# Patient Record
Sex: FEMALE | Race: NATIVE HAWAIIAN OR OTHER PACIFIC ISLANDER | Employment: UNEMPLOYED | URBAN - METROPOLITAN AREA
[De-identification: names, ages, dates, MRNs, and addresses within clinical notes are randomized per-mention and may not be internally consistent; named-entity substitution may affect disease eponyms.]

---

## 2022-05-19 ENCOUNTER — HOSPITAL ENCOUNTER (EMERGENCY)
Age: 22
Discharge: HOME | End: 2022-05-19
Attending: STUDENT IN AN ORGANIZED HEALTH CARE EDUCATION/TRAINING PROGRAM | Admitting: STUDENT IN AN ORGANIZED HEALTH CARE EDUCATION/TRAINING PROGRAM
Payer: OTHER GOVERNMENT

## 2022-05-19 VITALS
HEART RATE: 53 BPM | DIASTOLIC BLOOD PRESSURE: 75 MMHG | WEIGHT: 120 LBS | OXYGEN SATURATION: 100 % | BODY MASS INDEX: 25.89 KG/M2 | HEIGHT: 57 IN | RESPIRATION RATE: 16 BRPM | TEMPERATURE: 98.4 F | SYSTOLIC BLOOD PRESSURE: 122 MMHG

## 2022-05-19 DIAGNOSIS — N94.6 DYSMENORRHEA: Primary | ICD-10-CM

## 2022-05-19 DIAGNOSIS — D72.829 LEUKOCYTOSIS, UNSPECIFIED TYPE: ICD-10-CM

## 2022-05-19 LAB
ALBUMIN SERPL-MCNC: 4.5 G/DL (ref 3.5–5)
ALBUMIN/GLOB SERPL: 1.5 {RATIO} (ref 1.1–2.2)
ALP SERPL-CCNC: 61 U/L (ref 45–117)
ALT SERPL-CCNC: 21 U/L (ref 12–78)
ANION GAP SERPL CALC-SCNC: 7 MMOL/L (ref 5–15)
APPEARANCE UR: ABNORMAL
AST SERPL W P-5'-P-CCNC: 24 U/L (ref 15–37)
BACTERIA URNS QL MICRO: NEGATIVE /HPF
BASOPHILS # BLD: 0 K/UL (ref 0–0.1)
BASOPHILS NFR BLD: 0 % (ref 0–1)
BILIRUB SERPL-MCNC: 0.9 MG/DL (ref 0.2–1)
BILIRUB UR QL: NEGATIVE
BUN SERPL-MCNC: 11 MG/DL (ref 6–20)
BUN/CREAT SERPL: 13 (ref 12–20)
CA-I BLD-MCNC: 9.1 MG/DL (ref 8.5–10.1)
CHLORIDE SERPL-SCNC: 108 MMOL/L (ref 97–108)
CO2 SERPL-SCNC: 25 MMOL/L (ref 21–32)
COLOR UR: ABNORMAL
CREAT SERPL-MCNC: 0.84 MG/DL (ref 0.55–1.02)
DIFFERENTIAL METHOD BLD: ABNORMAL
EOSINOPHIL # BLD: 0 K/UL (ref 0–0.4)
EOSINOPHIL NFR BLD: 0 % (ref 0–7)
ERYTHROCYTE [DISTWIDTH] IN BLOOD BY AUTOMATED COUNT: 12.6 % (ref 11.5–14.5)
GLOBULIN SER CALC-MCNC: 3.1 G/DL (ref 2–4)
GLUCOSE SERPL-MCNC: 106 MG/DL (ref 65–100)
GLUCOSE UR STRIP.AUTO-MCNC: NEGATIVE MG/DL
HCG SERPL QL: NEGATIVE
HCT VFR BLD AUTO: 37.8 % (ref 35–47)
HGB BLD-MCNC: 12.6 G/DL (ref 11.5–16)
HGB UR QL STRIP: ABNORMAL
HYALINE CASTS URNS QL MICRO: ABNORMAL /LPF (ref 0–5)
IMM GRANULOCYTES # BLD AUTO: 0.1 K/UL (ref 0–0.04)
IMM GRANULOCYTES NFR BLD AUTO: 0 % (ref 0–0.5)
KETONES UR QL STRIP.AUTO: 80 MG/DL
KOH PREP SPEC: NORMAL
LEUKOCYTE ESTERASE UR QL STRIP.AUTO: ABNORMAL
LYMPHOCYTES # BLD: 2 K/UL (ref 0.8–3.5)
LYMPHOCYTES NFR BLD: 14 % (ref 12–49)
MCH RBC QN AUTO: 29.7 PG (ref 26–34)
MCHC RBC AUTO-ENTMCNC: 33.3 G/DL (ref 30–36.5)
MCV RBC AUTO: 89.2 FL (ref 80–99)
MONOCYTES # BLD: 0.7 K/UL (ref 0–1)
MONOCYTES NFR BLD: 5 % (ref 5–13)
MUCOUS THREADS URNS QL MICRO: ABNORMAL /LPF
NEUTS SEG # BLD: 11.7 K/UL (ref 1.8–8)
NEUTS SEG NFR BLD: 81 % (ref 32–75)
NITRITE UR QL STRIP.AUTO: NEGATIVE
NRBC # BLD: 0 K/UL (ref 0–0.01)
NRBC BLD-RTO: 0 PER 100 WBC
PH UR STRIP: 6 [PH] (ref 5–8)
PLATELET # BLD AUTO: 172 K/UL (ref 150–400)
PMV BLD AUTO: 11.9 FL (ref 8.9–12.9)
POTASSIUM SERPL-SCNC: 3.4 MMOL/L (ref 3.5–5.1)
PROT SERPL-MCNC: 7.6 G/DL (ref 6.4–8.2)
PROT UR STRIP-MCNC: 100 MG/DL
RBC # BLD AUTO: 4.24 M/UL (ref 3.8–5.2)
RBC #/AREA URNS HPF: ABNORMAL /HPF (ref 0–5)
SODIUM SERPL-SCNC: 140 MMOL/L (ref 136–145)
SP GR UR REFRACTOMETRY: 1.03 (ref 1–1.03)
SPECIAL REQUESTS,SREQ: NORMAL
TRICHOMONAS RAPID AG, TRICR: NEGATIVE
UA: UC IF INDICATED,UAUC: ABNORMAL
UROBILINOGEN UR QL STRIP.AUTO: 2 EU/DL (ref 0.1–1)
WBC # BLD AUTO: 14.6 K/UL (ref 3.6–11)
WBC URNS QL MICRO: ABNORMAL /HPF (ref 0–4)

## 2022-05-19 PROCEDURE — 99283 EMERGENCY DEPT VISIT LOW MDM: CPT

## 2022-05-19 PROCEDURE — 85025 COMPLETE CBC W/AUTO DIFF WBC: CPT

## 2022-05-19 PROCEDURE — 84703 CHORIONIC GONADOTROPIN ASSAY: CPT

## 2022-05-19 PROCEDURE — 81001 URINALYSIS AUTO W/SCOPE: CPT

## 2022-05-19 PROCEDURE — 87491 CHLMYD TRACH DNA AMP PROBE: CPT

## 2022-05-19 PROCEDURE — 87808 TRICHOMONAS ASSAY W/OPTIC: CPT

## 2022-05-19 PROCEDURE — 74011250637 HC RX REV CODE- 250/637: Performed by: STUDENT IN AN ORGANIZED HEALTH CARE EDUCATION/TRAINING PROGRAM

## 2022-05-19 PROCEDURE — 36415 COLL VENOUS BLD VENIPUNCTURE: CPT

## 2022-05-19 PROCEDURE — 80053 COMPREHEN METABOLIC PANEL: CPT

## 2022-05-19 PROCEDURE — 87210 SMEAR WET MOUNT SALINE/INK: CPT

## 2022-05-19 PROCEDURE — 87086 URINE CULTURE/COLONY COUNT: CPT

## 2022-05-19 RX ORDER — OXYCODONE AND ACETAMINOPHEN 5; 325 MG/1; MG/1
1 TABLET ORAL
Qty: 7 TABLET | Refills: 0 | Status: SHIPPED | OUTPATIENT
Start: 2022-05-19 | End: 2022-05-21

## 2022-05-19 RX ORDER — OXYCODONE AND ACETAMINOPHEN 5; 325 MG/1; MG/1
1 TABLET ORAL
Status: COMPLETED | OUTPATIENT
Start: 2022-05-19 | End: 2022-05-19

## 2022-05-19 RX ORDER — IBUPROFEN 400 MG/1
400 TABLET ORAL
Qty: 20 TABLET | Refills: 0 | Status: SHIPPED | OUTPATIENT
Start: 2022-05-19 | End: 2022-06-01

## 2022-05-19 RX ORDER — ACETAMINOPHEN 325 MG/1
650 TABLET ORAL
Qty: 40 TABLET | Refills: 0 | Status: SHIPPED | OUTPATIENT
Start: 2022-05-19 | End: 2022-06-01

## 2022-05-19 RX ORDER — ACETAMINOPHEN 325 MG/1
650 TABLET ORAL
Status: COMPLETED | OUTPATIENT
Start: 2022-05-19 | End: 2022-05-19

## 2022-05-19 RX ADMIN — OXYCODONE HYDROCHLORIDE AND ACETAMINOPHEN 1 TABLET: 5; 325 TABLET ORAL at 04:06

## 2022-05-19 RX ADMIN — ACETAMINOPHEN 650 MG: 325 TABLET ORAL at 04:06

## 2022-05-19 NOTE — ED NOTES
Pelvic exam done by dr Jam Razo with assist of maddie joe rn. Specimens were obtained and taken to lab.

## 2022-05-19 NOTE — ED PROVIDER NOTES
[id number]  EMERGENCY DEPARTMENT ENCOUNTER NOTE    Date: 5/19/2022  Patient Name: Claudetta Denis      History of Presenting Illness     Chief Complaint   Patient presents with    Abdominal Pain       History Provided By: Patient    HPI: Claudetta Denis, 22 y.o. female with PMH of dysmenorrhea presenting to the ED with abdominal cramps, vomiting, and vaginal bleeding. Patient reports that she started having her cycle today in which he has been having suprapubic abdominal cramping which described as cramping and twisting, episodic, nothing specific makes it better or worse associated with heavy vaginal bleeding. She vomited approximately 3 times since her pain started at 7 PM.  She reports this is similar to pain that she has when she has her menstrual cycle. She has been trying to get referral to gynecology but was unable to due to issues with her primary care and insurance. Sexually active but uses protection. She is denying any preceding vaginal discharge. There are no other complaints, changes, or physical findings at this time. PCP: Janelle Douglass MD    Current Outpatient Medications   Medication Sig Dispense Refill    oxyCODONE-acetaminophen (Percocet) 5-325 mg per tablet Take 1 Tablet by mouth every eight (8) hours as needed for Pain for up to 2 days. Max Daily Amount: 3 Tablets. 7 Tablet 0    acetaminophen (TYLENOL) 325 mg tablet Take 2 Tablets by mouth every six (6) hours as needed for Pain. 40 Tablet 0    ibuprofen (MOTRIN) 400 mg tablet Take 1 Tablet by mouth every six (6) hours as needed for Pain. 20 Tablet 0    montelukast (SINGULAIR) 5 mg chewable tablet Take 5 mg by mouth nightly. Indications: ASTHMA PREVENTION      mometasone (NASONEX) 50 mcg/actuation nasal spray 2 Sprays as needed.  fluticasone (FLOVENT HFA) 110 mcg/actuation inhaler Take 2 Puffs by inhalation two (2) times a day.     Indications: ASTHMA PREVENTION      cetirizine (ZYRTEC) 5 mg/5 mL Soln Take 5 mg by mouth as needed.  ALBUTEROL IN Take  by inhalation two (2) times a day. MOTHER STATES USES 1-2 PUFFS 2 TIMES AS NEEDED          Past History     Past Medical History:  Past Medical History:   Diagnosis Date    Asthma        Past Surgical History:  History reviewed. No pertinent surgical history. Family History:  Family History   Problem Relation Age of Onset    Asthma Mother     Hypertension Father     Asthma Maternal Grandmother        Social History:  Social History     Tobacco Use    Smoking status: Never Smoker    Smokeless tobacco: Never Used   Substance Use Topics    Alcohol use: No    Drug use: Yes     Types: Marijuana       Allergies:  No Known Allergies      Review of Systems     Review of Systems  A 10 point review of system was performed was negative except as noted above in HPI    Physical Exam     Physical Exam  Vitals and nursing note reviewed. Exam conducted with a chaperone present. Constitutional:       General: She is not in acute distress. Appearance: She is well-developed. She is not diaphoretic. HENT:      Head: Normocephalic and atraumatic. Eyes:      Extraocular Movements: Extraocular movements intact. Conjunctiva/sclera: Conjunctivae normal.   Cardiovascular:      Rate and Rhythm: Normal rate and regular rhythm. Heart sounds: Normal heart sounds. Pulmonary:      Effort: Pulmonary effort is normal.      Breath sounds: Normal breath sounds. Abdominal:      Palpations: Abdomen is soft. Tenderness: There is abdominal tenderness in the suprapubic area. Genitourinary:     Vagina: No signs of injury and foreign body. Bleeding present. Cervix: Cervical bleeding present. No cervical motion tenderness, friability, lesion, erythema or eversion. Adnexa: Right adnexa normal and left adnexa normal.   Musculoskeletal:      Cervical back: Neck supple.       Right lower leg: No tenderness. No edema. Left lower leg: No tenderness. No edema. Neurological:      General: No focal deficit present. Mental Status: She is alert and oriented to person, place, and time. Lab and Diagnostic Study Results     Labs -   No results found for this or any previous visit (from the past 12 hour(s)). Radiologic Studies -   [unfilled]  CT Results  (Last 48 hours)    None        CXR Results  (Last 48 hours)    None          Medical Decision Making and ED Course   - I am the first and primary provider for this patient AND AM THE PRIMARY PROVIDER OF RECORD. - I reviewed the vital signs, available nursing notes, past medical history, past surgical history, family history and social history. - Initial assessment performed. The patients presenting problems have been discussed, and the staff are in agreement with the care plan formulated and outlined with them. I have encouraged them to ask questions as they arise throughout their visit. Vital Signs-Reviewed the patient's vital signs. No data found. Records Reviewed: Nursing Notes    Provider Notes (Medical Decision Making):     Patient presenting to the ED with abdominal pain. This is similar in pattern to her dysmenorrhea. She is having trouble getting in touch with gynecology due to insurance and referral issue. Her abdominal examination and pelvic examination did not show any concerning findings for acute abdomen. She did get 1 dose of Percocet and acetaminophen with resolution of her symptoms. I did work her up and had her work-up revealed mild leukocytosis which is very likely reactive, chemistry within acceptable range, negative pregnancy test as well as KOH and trichomonas. Urinalysis findings are more consistent with the vaginal bleeding that she had. She does have negative bacteria on urinalysis. Also she does not have any symptoms suggestive for urinary tract infection.   She is not concerned for STD and thus would not want to be treated empirically and we will follow-up on the results. Given the severity of her symptoms and the recurrent nature of the symptoms monthly for years, short course of pain medication and follow-up with gynecology is appropriate. I did give her referral to gynecology and encouraged her to make a phone call and arrange expedited follow-up and get her doctor to send a referral.  Anticipatory guidance return precautions discussed with the patient. She is instructed to come back to the ED if symptoms worsen, evolve develop new concerns. Disposition     Disposition: Condition improved  DC- Adult Discharges: All of the diagnostic tests were reviewed and questions answered. Diagnosis, care plan and treatment options were discussed. The patient understands the instructions and will follow up as directed. The patients results have been reviewed with them. They have been counseled regarding their diagnosis. The patient verbally convey understanding and agreement of the signs, symptoms, diagnosis, treatment and prognosis and additionally agrees to follow up as recommended with their PCP in 24 - 48 hours. They also agree with the care-plan and convey that all of their questions have been answered. I have also put together some discharge instructions for them that include: 1) educational information regarding their diagnosis, 2) how to care for their diagnosis at home, as well a 3) list of reasons why they would want to return to the ED prior to their follow-up appointment, should their condition change. Discharged      DISCHARGE PLAN:  1. Current Discharge Medication List      CONTINUE these medications which have NOT CHANGED    Details   diazepam (VALIUM) 5 mg tablet Take 1 Tab by mouth every six (6) hours as needed (spasms). Qty: 10 Tab, Refills: 0      montelukast (SINGULAIR) 5 mg chewable tablet Take 5 mg by mouth nightly.     Indications: ASTHMA PREVENTION      mometasone (NASONEX) 50 mcg/actuation nasal spray 2 Sprays as needed. fluticasone (FLOVENT HFA) 110 mcg/actuation inhaler Take 2 Puffs by inhalation two (2) times a day. Indications: ASTHMA PREVENTION      cetirizine (ZYRTEC) 5 mg/5 mL Soln Take 5 mg by mouth as needed. ALBUTEROL IN Take  by inhalation two (2) times a day. MOTHER STATES USES 1-2 PUFFS 2 TIMES AS NEEDED            2.   Follow-up Information     Follow up With Specialties Details Why [facility]    [address] [facility] EMERGENCY DEPT Emergency Medicine Go to  As needed, If symptoms worsen [address] [facility] Jacobo Knutson MD Pediatric Medicine On 5/20/2022 For reevaluation, Discuss your visit to the ER [address] [place] [address] 53      Reyes Rondo, MD Obstetrics & Gynecology Schedule an appointment as soon as possible for a visit in 1 week For reevaluation, Discuss your visit to the ER [address] [address]  [phone]          3. Return to ED if worse   4. Discharge Medication List as of 5/19/2022  7:10 AM      START taking these medications    Details   oxyCODONE-acetaminophen (Percocet) 5-325 mg per tablet Take 1 Tablet by mouth every eight (8) hours as needed for Pain for up to 2 days. Max Daily Amount: 3 Tablets., Normal, Disp-7 Tablet, R-0      acetaminophen (TYLENOL) 325 mg tablet Take 2 Tablets by mouth every six (6) hours as needed for Pain., Normal, Disp-40 Tablet, R-0      ibuprofen (MOTRIN) 400 mg tablet Take 1 Tablet by mouth every six (6) hours as needed for Pain., Normal, Disp-20 Tablet, R-0         CONTINUE these medications which have NOT CHANGED    Details   montelukast (SINGULAIR) 5 mg chewable tablet Take 5 mg by mouth nightly. Indications: ASTHMA PREVENTIONHistorical Med, 5 mg      mometasone (NASONEX) 50 mcg/actuation nasal spray 2 Sprays as needed.   Historical Med, 2 Spray      fluticasone (FLOVENT HFA) 110 mcg/actuation inhaler If ordering PO change Admin. Inst. below, type in .fluticasonepo  Not intended for PRN use. Take 2 Puffs by inhalation two (2) times a day. Indications: ASTHMA PREVENTIONHistorical Med, 2 Puff      cetirizine (ZYRTEC) 5 mg/5 mL Soln Take 5 mg by mouth as needed. Historical Med, 5 mg      ALBUTEROL IN Take  by inhalation two (2) times a day. MOTHER STATES USES 1-2 PUFFS 2 TIMES AS NEEDED Historical Med         STOP taking these medications       diazepam (VALIUM) 5 mg tablet Comments:   Reason for Stopping:               Diagnosis     Clinical Impression:   1. Dysmenorrhea    2. Leukocytosis, unspecified type        Attestations: Cinthya Linder MD    Please note that this dictation was completed with [email or web address], the [employer] voice recognition software. Quite often unanticipated grammatical, syntax, homophones, and other interpretive errors are inadvertently transcribed by the computer software. Please disregard these errors. Please excuse any errors that have escaped final proofreading. Thank you.

## 2022-05-19 NOTE — DISCHARGE INSTRUCTIONS
Thank you! Thank you for allowing me to care for you in the emergency department. I sincerely hope that you are satisfied with your visit today. It is my goal to provide you with excellent care. Below you will find a list of your labs and imaging from your visit today. Should you have any questions regarding these results please do not hesitate to call the emergency department. Labs -     Recent Results (from the past 12 hour(s))   CBC WITH AUTOMATED DIFF    Collection Time: 05/19/22  3:36 AM   Result Value Ref Range    WBC 14.6 (H) 3.6 - 11.0 K/uL    RBC 4.24 3.80 - 5.20 M/uL    HGB 12.6 11.5 - 16.0 g/dL    HCT 37.8 35.0 - 47.0 %    MCV 89.2 80.0 - 99.0 FL    MCH 29.7 26.0 - 34.0 PG    MCHC 33.3 30.0 - 36.5 g/dL    RDW 12.6 11.5 - 14.5 %    PLATELET 914 711 - 243 K/uL    MPV 11.9 8.9 - 12.9 FL    NRBC 0.0 0.0  WBC    ABSOLUTE NRBC 0.00 0.00 - 0.01 K/uL    NEUTROPHILS 81 (H) 32 - 75 %    LYMPHOCYTES 14 12 - 49 %    MONOCYTES 5 5 - 13 %    EOSINOPHILS 0 0 - 7 %    BASOPHILS 0 0 - 1 %    IMMATURE GRANULOCYTES 0 0 - 0.5 %    ABS. NEUTROPHILS 11.7 (H) 1.8 - 8.0 K/UL    ABS. LYMPHOCYTES 2.0 0.8 - 3.5 K/UL    ABS. MONOCYTES 0.7 0.0 - 1.0 K/UL    ABS. EOSINOPHILS 0.0 0.0 - 0.4 K/UL    ABS. BASOPHILS 0.0 0.0 - 0.1 K/UL    ABS. IMM. GRANS. 0.1 (H) 0.00 - 0.04 K/UL    DF AUTOMATED     METABOLIC PANEL, COMPREHENSIVE    Collection Time: 05/19/22  3:36 AM   Result Value Ref Range    Sodium 140 136 - 145 mmol/L    Potassium 3.4 (L) 3.5 - 5.1 mmol/L    Chloride 108 97 - 108 mmol/L    CO2 25 21 - 32 mmol/L    Anion gap 7 5 - 15 mmol/L    Glucose 106 (H) 65 - 100 mg/dL    BUN 11 6 - 20 mg/dL    Creatinine 0.84 0.55 - 1.02 mg/dL    BUN/Creatinine ratio 13 12 - 20      GFR est AA >60 >60 ml/min/1.73m2    GFR est non-AA >60 >60 ml/min/1.73m2    Calcium 9.1 8.5 - 10.1 mg/dL    Bilirubin, total 0.9 0.2 - 1.0 mg/dL    AST (SGOT) 24 15 - 37 U/L    ALT (SGPT) 21 12 - 78 U/L    Alk.  phosphatase 61 45 - 117 U/L    Protein, total 7.6 6.4 - 8.2 g/dL    Albumin 4.5 3.5 - 5.0 g/dL    Globulin 3.1 2.0 - 4.0 g/dL    A-G Ratio 1.5 1.1 - 2.2     HCG QL SERUM    Collection Time: 05/19/22  3:36 AM   Result Value Ref Range    HCG, Ql. Negative Negative     URINALYSIS W/ REFLEX CULTURE    Collection Time: 05/19/22  3:36 AM    Specimen: Urine   Result Value Ref Range    Color Yellow/Straw      Appearance Turbid (A) Clear      Specific gravity 1.027 1.003 - 1.030      pH (UA) 6.0 5.0 - 8.0      Protein 100 (A) Negative mg/dL    Glucose Negative Negative mg/dL    Ketone 80 (A) Negative mg/dL    Bilirubin Negative Negative      Blood Large (A) Negative      Urobilinogen 2.0 (H) 0.1 - 1.0 EU/dL    Nitrites Negative Negative      Leukocyte Esterase Moderate (A) Negative      WBC  0 - 4 /hpf    RBC  0 - 5 /hpf    Bacteria Negative Negative /hpf    UA:UC IF INDICATED Urine Culture Ordered (A) Culture not indicated by UA result      Mucus Trace (A) Negative /lpf    Hyaline cast 0-2 0 - 5 /lpf       Radiologic Studies -   No orders to display     CT Results  (Last 48 hours)      None          CXR Results  (Last 48 hours)      None               If you feel that you have not received excellent quality care or timely care, please ask to speak to the nurse manager. Please choose us in the future for your continued health care needs. ------------------------------------------------------------------------------------------------------------  The exam and treatment you received in the Emergency Department were for an urgent problem and are not intended as complete care. It is important that you follow-up with a doctor, nurse practitioner, or physician assistant to:  (1) confirm your diagnosis,  (2) re-evaluation of changes in your illness and treatment, and  (3) for ongoing care.   If your symptoms become worse or you do not improve as expected and you are unable to reach your usual health care provider, you should return to the Emergency Department. We are available 24 hours a day. Please take your discharge instructions with you when you go to your follow-up appointment. If you have any problem arranging a follow-up appointment, contact the Emergency Department immediately. If a prescription has been provided, please have it filled as soon as possible to prevent a delay in treatment. Read the entire medication instruction sheet provided to you by the pharmacy. If you have any questions or reservations about taking the medication due to side effects or interactions with other medications, please call your primary care physician or contact the ER to speak with the charge nurse. Make an appointment with your family doctor or the physician you were referred to for follow-up of this visit as instructed on your discharge paperwork, as this is a mandatory follow-up. Return to the ER if you are unable to be seen or if you are unable to be seen in a timely manner. If you have any problem arranging the follow-up visit, contact the Emergency Department immediately.

## 2022-05-21 LAB
BACTERIA SPEC CULT: NORMAL
C TRACH RRNA SPEC QL NAA+PROBE: NEGATIVE
COLONY COUNT,CNT: NORMAL
COLONY COUNT,CNT: NORMAL
N GONORRHOEA RRNA SPEC QL NAA+PROBE: NEGATIVE
PLEASE NOTE:, 188601: NORMAL
SPECIAL REQUESTS,SREQ: NORMAL
SPECIMEN SOURCE: NORMAL

## 2022-05-31 ENCOUNTER — HOSPITAL ENCOUNTER (EMERGENCY)
Age: 22
Discharge: HOME | End: 2022-05-31
Attending: FAMILY MEDICINE
Payer: OTHER GOVERNMENT

## 2022-05-31 ENCOUNTER — APPOINTMENT (OUTPATIENT)
Dept: GENERAL RADIOLOGY | Age: 22
End: 2022-05-31
Attending: FAMILY MEDICINE
Payer: OTHER GOVERNMENT

## 2022-05-31 VITALS
DIASTOLIC BLOOD PRESSURE: 60 MMHG | RESPIRATION RATE: 16 BRPM | HEIGHT: 57 IN | SYSTOLIC BLOOD PRESSURE: 112 MMHG | WEIGHT: 120 LBS | TEMPERATURE: 99.1 F | OXYGEN SATURATION: 99 % | BODY MASS INDEX: 25.89 KG/M2 | HEART RATE: 87 BPM

## 2022-05-31 DIAGNOSIS — J06.9 VIRAL URI WITH COUGH: ICD-10-CM

## 2022-05-31 DIAGNOSIS — E86.0 DEHYDRATION: Primary | ICD-10-CM

## 2022-05-31 LAB
ALBUMIN SERPL-MCNC: 4.8 G/DL (ref 3.5–5)
ALBUMIN/GLOB SERPL: 1.5 {RATIO} (ref 1.1–2.2)
ALP SERPL-CCNC: 58 U/L (ref 45–117)
ALT SERPL-CCNC: 28 U/L (ref 12–78)
AMPHET UR QL SCN: NEGATIVE
ANION GAP SERPL CALC-SCNC: 16 MMOL/L (ref 5–15)
APPEARANCE UR: CLEAR
AST SERPL W P-5'-P-CCNC: 21 U/L (ref 15–37)
BARBITURATES UR QL SCN: NEGATIVE
BASOPHILS # BLD: 0.1 K/UL (ref 0–0.1)
BASOPHILS NFR BLD: 1 % (ref 0–1)
BENZODIAZ UR QL: NEGATIVE
BILIRUB SERPL-MCNC: 1.2 MG/DL (ref 0.2–1)
BILIRUB UR QL: NEGATIVE
BUN SERPL-MCNC: 11 MG/DL (ref 6–20)
BUN/CREAT SERPL: 15 (ref 12–20)
BUPRENORPHINE UR QL: NEGATIVE
CA-I BLD-MCNC: 10 MG/DL (ref 8.5–10.1)
CANNABINOIDS UR QL SCN: POSITIVE
CHLORIDE SERPL-SCNC: 104 MMOL/L (ref 97–108)
CO2 SERPL-SCNC: 20 MMOL/L (ref 21–32)
COCAINE UR QL SCN: NEGATIVE
COLOR UR: YELLOW
CREAT SERPL-MCNC: 0.75 MG/DL (ref 0.55–1.02)
DIFFERENTIAL METHOD BLD: ABNORMAL
EOSINOPHIL # BLD: 0 K/UL (ref 0–0.4)
EOSINOPHIL NFR BLD: 0 % (ref 0–7)
ERYTHROCYTE [DISTWIDTH] IN BLOOD BY AUTOMATED COUNT: 12.6 % (ref 11.5–14.5)
GLOBULIN SER CALC-MCNC: 3.2 G/DL (ref 2–4)
GLUCOSE SERPL-MCNC: 94 MG/DL (ref 65–100)
GLUCOSE UR STRIP.AUTO-MCNC: NEGATIVE MG/DL
HCG UR QL: NEGATIVE
HCT VFR BLD AUTO: 36.1 % (ref 35–47)
HGB BLD-MCNC: 12.3 G/DL (ref 11.5–16)
HGB UR QL STRIP: NEGATIVE
IMM GRANULOCYTES # BLD AUTO: 0 K/UL (ref 0–0.04)
IMM GRANULOCYTES NFR BLD AUTO: 0 % (ref 0–0.5)
KETONES UR QL STRIP.AUTO: >80 MG/DL
LEUKOCYTE ESTERASE UR QL STRIP.AUTO: NEGATIVE
LYMPHOCYTES # BLD: 0.6 K/UL (ref 0.8–3.5)
LYMPHOCYTES NFR BLD: 8 % (ref 12–49)
MCH RBC QN AUTO: 29.8 PG (ref 26–34)
MCHC RBC AUTO-ENTMCNC: 34.1 G/DL (ref 30–36.5)
MCV RBC AUTO: 87.4 FL (ref 80–99)
METHADONE UR QL: NEGATIVE
METHAMPHET UR QL: NEGATIVE
MONOCYTES # BLD: 0.9 K/UL (ref 0–1)
MONOCYTES NFR BLD: 12 % (ref 5–13)
NEUTS SEG # BLD: 6 K/UL (ref 1.8–8)
NEUTS SEG NFR BLD: 79 % (ref 32–75)
NITRITE UR QL STRIP.AUTO: NEGATIVE
OPIATES UR QL: NEGATIVE
OXYCODONE UR QL SCN: NEGATIVE
PCP UR QL: NEGATIVE
PH UR STRIP: 5 [PH] (ref 5–8)
PLATELET # BLD AUTO: 158 K/UL (ref 150–400)
PMV BLD AUTO: 11.3 FL (ref 8.9–12.9)
POTASSIUM SERPL-SCNC: 3.3 MMOL/L (ref 3.5–5.1)
PROPOXYPH UR QL: NEGATIVE
PROT SERPL-MCNC: 8 G/DL (ref 6.4–8.2)
PROT UR STRIP-MCNC: NEGATIVE MG/DL
RBC # BLD AUTO: 4.13 M/UL (ref 3.8–5.2)
SODIUM SERPL-SCNC: 140 MMOL/L (ref 136–145)
SP GR UR REFRACTOMETRY: 1.02 (ref 1–1.03)
TRICYCLICS UR QL: NEGATIVE
UROBILINOGEN UR QL STRIP.AUTO: 0.1 EU/DL (ref 0.2–1)
WBC # BLD AUTO: 7.5 K/UL (ref 3.6–11)

## 2022-05-31 PROCEDURE — 81025 URINE PREGNANCY TEST: CPT

## 2022-05-31 PROCEDURE — 80053 COMPREHEN METABOLIC PANEL: CPT

## 2022-05-31 PROCEDURE — 96374 THER/PROPH/DIAG INJ IV PUSH: CPT

## 2022-05-31 PROCEDURE — 74011250636 HC RX REV CODE- 250/636: Performed by: FAMILY MEDICINE

## 2022-05-31 PROCEDURE — 80307 DRUG TEST PRSMV CHEM ANLYZR: CPT

## 2022-05-31 PROCEDURE — 71045 X-RAY EXAM CHEST 1 VIEW: CPT

## 2022-05-31 PROCEDURE — 81003 URINALYSIS AUTO W/O SCOPE: CPT

## 2022-05-31 PROCEDURE — 99284 EMERGENCY DEPT VISIT MOD MDM: CPT

## 2022-05-31 PROCEDURE — 36415 COLL VENOUS BLD VENIPUNCTURE: CPT

## 2022-05-31 PROCEDURE — 85025 COMPLETE CBC W/AUTO DIFF WBC: CPT

## 2022-05-31 RX ORDER — ONDANSETRON 2 MG/ML
4 INJECTION INTRAMUSCULAR; INTRAVENOUS
Status: COMPLETED | OUTPATIENT
Start: 2022-05-31 | End: 2022-05-31

## 2022-05-31 RX ADMIN — SODIUM CHLORIDE 1000 ML: 9 INJECTION, SOLUTION INTRAVENOUS at 16:35

## 2022-05-31 RX ADMIN — ONDANSETRON 4 MG: 2 INJECTION INTRAMUSCULAR; INTRAVENOUS at 16:35

## 2022-05-31 NOTE — Clinical Note
[facility] EMERGENCY DEPARTMENT  [address] [facility] [address]  [phone]    Work/School Note    Date: 5/31/2022    To Whom It May concern:      Corinne Abelson was seen and treated today in the emergency room by the following provider(s):  Attending Provider: Zay Holland DO. Corinne Abelson is excused from work/school on 05/31/22. She is clear to return to work/school on 06/01/22.         Sincerely,          John Fisher DO

## 2022-05-31 NOTE — ED TRIAGE NOTES
Pt started with a bad cough yesterday and woke up with morning with her throat feeling tight and shortness of breath then pt started vomiting. Pt took benadryl around 1400. Pt states the cough will make it hard for her to catch her breath.

## 2022-06-05 NOTE — ED PROVIDER NOTES
EMERGENCY DEPARTMENT HISTORY AND PHYSICAL EXAM      Date: 5/31/2022  Patient Name: Willie Birmingham    History of Presenting Illness     Chief complaint: Cough, shortness of breath, sore throat  History Provided By:     HPI: Willie Birmingham, is a very pleasant 22 y.o. female presenting to the ED with a chief complaint of cough, shortness of breath, sore throat. Recent onset of symptoms. Cough is dry. Shortness of breath occurs when she is having coughing spells. Eating and drinking less than usual.  No chest pain. No fevers chills rigors. There are no other complaints, changes, or physical findings at this time. PCP: Moses Leary MD    No current facility-administered medications on file prior to encounter. Current Outpatient Medications on File Prior to Encounter   Medication Sig Dispense Refill    ALBUTEROL IN Take  by inhalation two (2) times a day. MOTHER STATES USES 1-2 PUFFS 2 TIMES AS NEEDED          Past History     Past Medical History:  Past Medical History:   Diagnosis Date    Asthma        Past Surgical History:  History reviewed. No pertinent surgical history. Family History:  Family History   Problem Relation Age of Onset    Asthma Mother     Hypertension Father     Asthma Maternal Grandmother        Social History:  Social History     Tobacco Use    Smoking status: Never Smoker    Smokeless tobacco: Never Used   Substance Use Topics    Alcohol use: No    Drug use: Yes     Types: Marijuana       Allergies:  No Known Allergies      Review of Systems     Review of Systems   Constitutional: Negative for activity change, appetite change, chills, fatigue and fever. HENT: Positive for sore throat. Negative for congestion. Eyes: Negative for photophobia and visual disturbance. Respiratory: Positive for cough and shortness of breath. Negative for wheezing. Cardiovascular: Negative for chest pain, palpitations and leg swelling. Gastrointestinal: Negative for abdominal pain, diarrhea, nausea and vomiting. Endocrine: Negative for cold intolerance and heat intolerance. Musculoskeletal: Negative for gait problem and joint swelling. Skin: Negative for color change and rash. Neurological: Negative for dizziness and headaches. Physical Exam     Physical Exam  Constitutional:       Appearance: She is well-developed. HENT:      Head: Normocephalic and atraumatic. Mouth/Throat:      Pharynx: Oropharynx is clear. Comments: Posterior oropharynx is mildly erythematous, no tonsillar swelling or exudate. Uvula is midline. No swelling of tongue. No swelling under tongue. Patient is tolerating secretions without difficulty. No muffled voice. Dry mucous membranes  Eyes:      Conjunctiva/sclera: Conjunctivae normal.      Pupils: Pupils are equal, round, and reactive to light. Cardiovascular:      Rate and Rhythm: Normal rate and regular rhythm. Heart sounds: No murmur heard. Pulmonary:      Effort: No respiratory distress. Breath sounds: No stridor. No wheezing, rhonchi or rales. Abdominal:      General: There is no distension. Tenderness: There is no abdominal tenderness. There is no rebound. Musculoskeletal:      Cervical back: Normal range of motion and neck supple. Skin:     General: Skin is warm and dry. Neurological:      General: No focal deficit present. Mental Status: She is alert and oriented to person, place, and time. Psychiatric:         Mood and Affect: Mood normal.         Behavior: Behavior normal.         Lab and Diagnostic Study Results     Labs -   No results found for this or any previous visit (from the past 12 hour(s)). Radiologic Studies -   @lastxrresult@  CT Results  (Last 48 hours)    None        CXR Results  (Last 48 hours)    None            Medical Decision Making   - I am the first provider for this patient.   - I reviewed the vital signs, available nursing notes, past medical history, past surgical history, family history and social history. - Initial assessment performed. The patients presenting problems have been discussed, and they are in agreement with the care plan formulated and outlined with them. I have encouraged them to ask questions as they arise throughout their visit. Vital Signs-Reviewed the patient's vital signs. No data found. ED Course/ Provider Notes (Medical Decision Making):     Patient presented to the emergency department with a chief complaint of cough, shortness of breath, sore throat. On examination the patient is nontoxic but uncomfortable-appearing. Vitals were reviewed per above. Laboratory work without marked abnormality. Patient is dry appearing on exam.  She is feeling significantly better after 1 L normal saline. Discussed supportive measures for her likely self-limiting viral URI. Tennille Buchanan was given a thorough list of signs and symptoms that would warrant an immediate return to the emergency department. Otherwise Tennille Buchanan will follow up with PCP. Procedures   Medical Decision Makingedical Decision Making  Performed by: Sincere Tatum DO  Procedures  None       Disposition   Disposition:     Home     All of the diagnostic tests were reviewed and questions answered. Diagnosis, care plan and treatment options were discussed. The patient understands the instructions and will follow up as directed. The patients results have been reviewed with them. They have been counseled regarding their diagnosis. The patient verbally convey understanding and agreement of the signs, symptoms, diagnosis, treatment and prognosis and additionally agrees to follow up as recommended with their PCP in 24 - 48 hours. They also agree with the care-plan and convey that all of their questions have been answered.   I have also put together some discharge instructions for them that include: 1) educational information regarding their diagnosis, 2) how to care for their diagnosis at home, as well a 3) list of reasons why they would want to return to the ED prior to their follow-up appointment, should their condition change. DISCHARGE PLAN:    1. Cannot display discharge medications since this patient is not currently admitted. 2.   Follow-up Information     Follow up With Specialties Details Why Contact Info    Your primary care doctor  Schedule an appointment as soon as possible for a visit in 2 days              3.  Return to ED if worse       4. Discharge Medication List as of 5/31/2022  5:39 PM            Diagnosis     Clinical Impression:    1. Dehydration    2. Viral URI with cough        Attestations:    Shiraz Lucia, DO    Please note that this dictation was completed with [employer], the computer voice recognition software. Quite often unanticipated grammatical, syntax, homophones, and other interpretive errors are inadvertently transcribed by the computer software. Please disregard these errors. Please excuse any errors that have escaped final proofreading. Thank you.

## 2022-09-28 ENCOUNTER — APPOINTMENT (OUTPATIENT)
Dept: ULTRASOUND IMAGING | Age: 22
End: 2022-09-28
Attending: NURSE PRACTITIONER
Payer: OTHER GOVERNMENT

## 2022-09-28 ENCOUNTER — HOSPITAL ENCOUNTER (EMERGENCY)
Age: 22
Discharge: HOME | End: 2022-09-28
Attending: EMERGENCY MEDICINE
Payer: OTHER GOVERNMENT

## 2022-09-28 VITALS
SYSTOLIC BLOOD PRESSURE: 124 MMHG | RESPIRATION RATE: 16 BRPM | HEIGHT: 57 IN | OXYGEN SATURATION: 100 % | HEART RATE: 72 BPM | WEIGHT: 112 LBS | TEMPERATURE: 98.1 F | DIASTOLIC BLOOD PRESSURE: 70 MMHG | BODY MASS INDEX: 24.16 KG/M2

## 2022-09-28 DIAGNOSIS — O21.0 HYPEREMESIS GRAVIDARUM: Primary | ICD-10-CM

## 2022-09-28 DIAGNOSIS — E86.0 DEHYDRATION: ICD-10-CM

## 2022-09-28 LAB
ANION GAP SERPL CALC-SCNC: 9 MMOL/L (ref 5–15)
APPEARANCE UR: ABNORMAL
BACTERIA URNS QL MICRO: ABNORMAL /HPF
BASOPHILS # BLD: 0 K/UL (ref 0–0.1)
BASOPHILS NFR BLD: 0 % (ref 0–1)
BILIRUB UR QL: NEGATIVE
BUN SERPL-MCNC: 10 MG/DL (ref 6–20)
BUN/CREAT SERPL: 16 (ref 12–20)
CALCIUM SERPL-MCNC: 9.8 MG/DL (ref 8.5–10.1)
CHLORIDE SERPL-SCNC: 106 MMOL/L (ref 97–108)
CO2 SERPL-SCNC: 21 MMOL/L (ref 21–32)
COLOR UR: YELLOW
COVID-19 RAPID TEST, COVR: NOT DETECTED
CREAT SERPL-MCNC: 0.64 MG/DL (ref 0.55–1.02)
DIFFERENTIAL METHOD BLD: ABNORMAL
EOSINOPHIL # BLD: 0 K/UL (ref 0–0.4)
EOSINOPHIL NFR BLD: 0 % (ref 0–7)
EPITH CASTS URNS QL MICRO: ABNORMAL /LPF
ERYTHROCYTE [DISTWIDTH] IN BLOOD BY AUTOMATED COUNT: 12.5 % (ref 11.5–14.5)
GLUCOSE SERPL-MCNC: 99 MG/DL (ref 65–100)
GLUCOSE UR STRIP.AUTO-MCNC: NEGATIVE MG/DL
HCT VFR BLD AUTO: 37.8 % (ref 35–47)
HGB BLD-MCNC: 13 G/DL (ref 11.5–16)
HGB UR QL STRIP: ABNORMAL
IMM GRANULOCYTES # BLD AUTO: 0 K/UL (ref 0–0.04)
IMM GRANULOCYTES NFR BLD AUTO: 0 % (ref 0–0.5)
KETONES UR QL STRIP.AUTO: >80 MG/DL
LEUKOCYTE ESTERASE UR QL STRIP.AUTO: NEGATIVE
LIPASE SERPL-CCNC: 71 U/L (ref 73–393)
LYMPHOCYTES # BLD: 0.6 K/UL (ref 0.8–3.5)
LYMPHOCYTES NFR BLD: 4 % (ref 12–49)
MAGNESIUM SERPL-MCNC: 2 MG/DL (ref 1.6–2.4)
MCH RBC QN AUTO: 29.6 PG (ref 26–34)
MCHC RBC AUTO-ENTMCNC: 34.4 G/DL (ref 30–36.5)
MCV RBC AUTO: 86.1 FL (ref 80–99)
MONOCYTES # BLD: 0.8 K/UL (ref 0–1)
MONOCYTES NFR BLD: 5 % (ref 5–13)
MUCOUS THREADS URNS QL MICRO: ABNORMAL /LPF
NEUTS SEG # BLD: 14.4 K/UL (ref 1.8–8)
NEUTS SEG NFR BLD: 91 % (ref 32–75)
NITRITE UR QL STRIP.AUTO: NEGATIVE
NRBC # BLD: 0 K/UL (ref 0–0.01)
NRBC BLD-RTO: 0 PER 100 WBC
PH UR STRIP: 5.5 [PH] (ref 5–8)
PLATELET # BLD AUTO: 199 K/UL (ref 150–400)
PMV BLD AUTO: 11.6 FL (ref 8.9–12.9)
POTASSIUM SERPL-SCNC: 3.7 MMOL/L (ref 3.5–5.1)
PROT UR STRIP-MCNC: 100 MG/DL
RBC # BLD AUTO: 4.39 M/UL (ref 3.8–5.2)
RBC #/AREA URNS HPF: ABNORMAL /HPF (ref 0–5)
RBC MORPH BLD: ABNORMAL
SODIUM SERPL-SCNC: 136 MMOL/L (ref 136–145)
SOURCE, COVRS: NORMAL
SP GR UR REFRACTOMETRY: 1.02 (ref 1–1.03)
UR CULT HOLD, URHOLD: NORMAL
UROBILINOGEN UR QL STRIP.AUTO: 0.2 EU/DL (ref 0.2–1)
WBC # BLD AUTO: 15.8 K/UL (ref 3.6–11)
WBC URNS QL MICRO: ABNORMAL /HPF (ref 0–4)

## 2022-09-28 PROCEDURE — 76817 TRANSVAGINAL US OBSTETRIC: CPT

## 2022-09-28 PROCEDURE — 99284 EMERGENCY DEPT VISIT MOD MDM: CPT

## 2022-09-28 PROCEDURE — 87635 SARS-COV-2 COVID-19 AMP PRB: CPT

## 2022-09-28 PROCEDURE — 96374 THER/PROPH/DIAG INJ IV PUSH: CPT

## 2022-09-28 PROCEDURE — 74011250636 HC RX REV CODE- 250/636

## 2022-09-28 PROCEDURE — 83690 ASSAY OF LIPASE: CPT

## 2022-09-28 PROCEDURE — 85025 COMPLETE CBC W/AUTO DIFF WBC: CPT

## 2022-09-28 PROCEDURE — 87086 URINE CULTURE/COLONY COUNT: CPT

## 2022-09-28 PROCEDURE — 96361 HYDRATE IV INFUSION ADD-ON: CPT

## 2022-09-28 PROCEDURE — 36415 COLL VENOUS BLD VENIPUNCTURE: CPT

## 2022-09-28 PROCEDURE — 83735 ASSAY OF MAGNESIUM: CPT

## 2022-09-28 PROCEDURE — 74011250636 HC RX REV CODE- 250/636: Performed by: NURSE PRACTITIONER

## 2022-09-28 PROCEDURE — 80048 BASIC METABOLIC PNL TOTAL CA: CPT

## 2022-09-28 PROCEDURE — 81001 URINALYSIS AUTO W/SCOPE: CPT

## 2022-09-28 RX ORDER — CEPHALEXIN 500 MG/1
500 CAPSULE ORAL 3 TIMES DAILY
Qty: 21 CAPSULE | Refills: 0 | Status: SHIPPED | OUTPATIENT
Start: 2022-09-28 | End: 2022-10-05

## 2022-09-28 RX ORDER — ONDANSETRON 2 MG/ML
8 INJECTION INTRAMUSCULAR; INTRAVENOUS
Status: COMPLETED | OUTPATIENT
Start: 2022-09-28 | End: 2022-09-28

## 2022-09-28 RX ORDER — ONDANSETRON 2 MG/ML
INJECTION INTRAMUSCULAR; INTRAVENOUS
Status: COMPLETED
Start: 2022-09-28 | End: 2022-09-28

## 2022-09-28 RX ORDER — ONDANSETRON HYDROCHLORIDE 8 MG/1
8 TABLET, FILM COATED ORAL
Qty: 15 TABLET | Refills: 0 | Status: SHIPPED | OUTPATIENT
Start: 2022-09-28

## 2022-09-28 RX ADMIN — ONDANSETRON HYDROCHLORIDE: 2 SOLUTION INTRAMUSCULAR; INTRAVENOUS at 09:20

## 2022-09-28 RX ADMIN — ONDANSETRON 8 MG: 2 INJECTION INTRAMUSCULAR; INTRAVENOUS at 09:16

## 2022-09-28 RX ADMIN — SODIUM CHLORIDE 1000 ML: 9 INJECTION, SOLUTION INTRAVENOUS at 09:16

## 2022-09-28 NOTE — ED TRIAGE NOTES
Pt arrives co [place] for 3 weeks  Pt is 8 week pregnant with first child  Pt was given phenergan by VANESSA but that makes her feel worse

## 2022-09-28 NOTE — DISCHARGE INSTRUCTIONS
Your labs and ultrasound look normal.  Your estimated gestational age is approximately 8 weeks. I recommend using the prescribed Zofran as needed for nausea. You do have a small amount of bacteria in your urine, and for that reason we will prescribe you a course of cephalexin. Please follow-up with your OB as planned next month.

## 2022-09-28 NOTE — ED PROVIDER NOTES
21-year-old female () presents the ER today for evaluation of nausea and vomiting during first trimester of pregnancy. Patient states that she was seen at the [facility] emergency department a few days ago for similar symptoms in which she was given intravenous fluids and antiemetics and felt a little bit better. She was subsequently discharged home on promethazine, but states that she has been unable to keep this medication down and is continued to have nausea and vomiting. She is not sure who her OB/GYN is, but she states that she is followed by [facility] and has another prenatal visit scheduled for mid October. She denies any fevers, acute urinary complaints, acute flank pain, vaginal discharge, vaginal bleeding. Past Medical History:   Diagnosis Date    Asthma        No past surgical history on file. Family History:   Problem Relation Age of Onset    Asthma Mother     Hypertension Father     Asthma Maternal Grandmother        Social History     Socioeconomic History    Marital status: SINGLE     Spouse name: Not on file    Number of children: Not on file    Years of education: Not on file    Highest education level: Not on file   Occupational History    Not on file   Tobacco Use    Smoking status: Never    Smokeless tobacco: Never   Substance and Sexual Activity    Alcohol use: No    Drug use: Yes     Types: Marijuana    Sexual activity: Never   Other Topics Concern    Not on file   Social History Narrative    Not on file     Social Determinants of Health     Financial Resource Strain: Not on file   Food Insecurity: Not on file   Transportation Needs: Not on file   Physical Activity: Not on file   Stress: Not on file   Social Connections: Not on file   Intimate Partner Violence: Not on file   Housing Stability: Not on file         ALLERGIES: Patient has no known allergies. Review of Systems   Constitutional:  Positive for appetite change and fatigue.    HENT: Positive for rhinorrhea and sneezing. Respiratory:  Positive for cough. Negative for shortness of breath. Gastrointestinal:  Positive for nausea and vomiting. Very mild left sided ab pain   Genitourinary:  Negative for pelvic pain, vaginal bleeding and vaginal discharge. All other systems reviewed and are negative. Vitals:    09/28/22 0901   BP: 129/74   Resp: 16   Temp: 98.6 °F (37 °C)   Weight: 50.8 kg (112 lb)   Height: 4' 9\" (1.448 m)            Physical Exam  Vitals and nursing note reviewed. Constitutional:       General: She is not in acute distress. Appearance: Normal appearance. She is not ill-appearing. HENT:      Head: Normocephalic and atraumatic. Right Ear: External ear normal.      Left Ear: External ear normal.      Nose: Nose normal.      Mouth/Throat:      Mouth: Mucous membranes are dry. Pharynx: Oropharynx is clear. Eyes:      General: No scleral icterus. Extraocular Movements: Extraocular movements intact. Conjunctiva/sclera: Conjunctivae normal.      Pupils: Pupils are equal, round, and reactive to light. Cardiovascular:      Rate and Rhythm: Normal rate and regular rhythm. Pulses: Normal pulses. Heart sounds: Normal heart sounds. Pulmonary:      Effort: Pulmonary effort is normal.      Breath sounds: Normal breath sounds. Abdominal:      General: Abdomen is flat. Bowel sounds are normal. There is no distension. Palpations: Abdomen is soft. Tenderness: There is no abdominal tenderness. There is no right CVA tenderness, left CVA tenderness, guarding or rebound. Musculoskeletal:         General: Normal range of motion. Cervical back: Normal range of motion and neck supple. No rigidity. No muscular tenderness. Skin:     General: Skin is warm and dry. Coloration: Skin is not pale. Neurological:      General: No focal deficit present. Mental Status: She is alert and oriented to person, place, and time. Psychiatric:         Mood and Affect: Mood is anxious. Behavior: Behavior normal.         Thought Content: Thought content normal.         Judgment: Judgment normal.        MDM     VITAL SIGNS:  Patient Vitals for the past 4 hrs:   Temp Pulse Resp BP   09/28/22 0901 98.6 °F (37 °C) 90 16 129/74         LABS:  Recent Results (from the past 6 hour(s))   LIPASE    Collection Time: 09/28/22  9:04 AM   Result Value Ref Range    Lipase 71 (L) 73 - 393 U/L   CBC WITH AUTOMATED DIFF    Collection Time: 09/28/22  9:05 AM   Result Value Ref Range    WBC 15.8 (H) 3.6 - 11.0 K/uL    RBC 4.39 3.80 - 5.20 M/uL    HGB 13.0 11.5 - 16.0 g/dL    HCT 37.8 35.0 - 47.0 %    MCV 86.1 80.0 - 99.0 FL    MCH 29.6 26.0 - 34.0 PG    MCHC 34.4 30.0 - 36.5 g/dL    RDW 12.5 11.5 - 14.5 %    PLATELET 948 808 - 604 K/uL    MPV 11.6 8.9 - 12.9 FL    NRBC 0.0 0  WBC    ABSOLUTE NRBC 0.00 0.00 - 0.01 K/uL    NEUTROPHILS 91 (H) 32 - 75 %    LYMPHOCYTES 4 (L) 12 - 49 %    MONOCYTES 5 5 - 13 %    EOSINOPHILS 0 0 - 7 %    BASOPHILS 0 0 - 1 %    IMMATURE GRANULOCYTES 0 0.0 - 0.5 %    ABS. NEUTROPHILS 14.4 (H) 1.8 - 8.0 K/UL    ABS. LYMPHOCYTES 0.6 (L) 0.8 - 3.5 K/UL    ABS. MONOCYTES 0.8 0.0 - 1.0 K/UL    ABS. EOSINOPHILS 0.0 0.0 - 0.4 K/UL    ABS. BASOPHILS 0.0 0.0 - 0.1 K/UL    ABS. IMM.  GRANS. 0.0 0.00 - 0.04 K/UL    DF SMEAR SCANNED      RBC COMMENTS NORMOCYTIC, NORMOCHROMIC     METABOLIC PANEL, BASIC    Collection Time: 09/28/22  9:05 AM   Result Value Ref Range    Sodium 136 136 - 145 mmol/L    Potassium 3.7 3.5 - 5.1 mmol/L    Chloride 106 97 - 108 mmol/L    CO2 21 21 - 32 mmol/L    Anion gap 9 5 - 15 mmol/L    Glucose 99 65 - 100 mg/dL    BUN 10 6 - 20 MG/DL    Creatinine 0.64 0.55 - 1.02 MG/DL    BUN/Creatinine ratio 16 12 - 20      GFR est AA >60 >60 ml/min/1.73m2    GFR est non-AA >60 >60 ml/min/1.73m2    Calcium 9.8 8.5 - 10.1 MG/DL   MAGNESIUM    Collection Time: 09/28/22  9:05 AM   Result Value Ref Range    Magnesium 2.0 1.6 - 2.4 mg/dL   URINALYSIS W/MICROSCOPIC    Collection Time: 09/28/22  9:22 AM   Result Value Ref Range    Color YELLOW      Appearance CLOUDY (A) CLEAR      Specific gravity 1.025 1.003 - 1.030      pH (UA) 5.5 5.0 - 8.0      Protein 100 (A) NEG mg/dL    Glucose Negative NEG mg/dL    Ketone >80 (A) NEG mg/dL    Bilirubin Negative NEG      Blood TRACE (A) NEG      Urobilinogen 0.2 0.2 - 1.0 EU/dL    Nitrites Negative NEG      Leukocyte Esterase Negative NEG      WBC 0-4 0 - 4 /hpf    RBC 0-5 0 - 5 /hpf    Epithelial cells MANY (A) FEW /lpf    Bacteria 2+ (A) NEG /hpf    Mucus 1+ (A) NEG /lpf   URINE CULTURE HOLD SAMPLE    Collection Time: 09/28/22  9:22 AM    Specimen: Serum; Urine   Result Value Ref Range    Urine culture hold        Urine on hold in Microbiology dept for 2 days. If unpreserved urine is submitted, it cannot be used for addtional testing after 24 hours, recollection will be required. COVID-19 RAPID TEST    Collection Time: 09/28/22  9:35 AM   Result Value Ref Range    Specimen source Nasopharyngeal      COVID-19 rapid test Not detected NOTD          IMAGING:  US UTS TRANSVAGINAL OB   Final Result   Single viable intrauterine pregnancy with estimated gestational age of 11 weeks   and 1 day. No sonographic evidence of complication. Recommend second trimester   screening obstetric ultrasound in 10 - 11 weeks. Medications During Visit:  Medications   sodium chloride 0.9 % bolus infusion 1,000 mL (0 mL IntraVENous Stopped 9/28/22 1031)   ondansetron (ZOFRAN) injection 8 mg (8 mg IntraVENous Given 9/28/22 0916)   ondansetron (ZOFRAN) 4 mg/2 mL injection (  Given 9/28/22 0920)         DECISION MAKING:  Julianne Blue is a 22 y.o. female who comes in as above. Work-up remarkable for leukocytosis (likely benign and secondary to pregnancy, vomiting, and hemoconcentration). Urine shows ketonuria with 2+ bacteriuria.   Prenatal ultrasound within normal limits and shows healthy IUP.    Patient is feeling significantly better after one-time dose of Zofran and is tolerating large quantities of p.o. fluids without difficulty. Recommended continued use of Zofran at home along with 7-day course of cephalexin for bacteriuria. Recommended scheduled prenatal follow-up mid next month as planned. The clinical decision making for this encounter included ordering and interpreting the above diagnostic tests with comparison to prior studies that are within our EMR. Past medical and surgical histories were reviewed, as were records from recent outpatient and emergency department visits. The above results discussed and reviewed with the patient. Patient verbalized understanding of the care plan, including any changes to current outpatient medication regimen, discussed disease process, symptom control, and follow-up care. Return precautions reviewed. IMPRESSION:  1. Hyperemesis gravidarum    2. Dehydration        DISPOSITION:  Discharged      Current Discharge Medication List        START taking these medications    Details   ondansetron hcl (Zofran) 8 mg tablet Take 1 Tablet by mouth every eight (8) hours as needed for Nausea or Vomiting. Qty: 15 Tablet, Refills: 0  Start date: 9/28/2022      cephALEXin (Keflex) 500 mg capsule Take 1 Capsule by mouth three (3) times daily for 7 days. Qty: 21 Capsule, Refills: 0  Start date: 9/28/2022, End date: 10/5/2022              Follow-up Information       Follow up With Specialties Details Why Contact Info    Eleazar Amor MD Pediatric Medicine  As needed [address] [place] [address]  [phone]      Your OB/GYN  Go on 10/14/2022                The patient is asked to follow-up with their primary care provider in the next several days. They are to call tomorrow for an appointment. The patient is asked to return promptly for any increased concerns or worsening of symptoms.   They can return to this emergency department or any other emergency department.       Procedures

## 2022-09-29 LAB
BACTERIA SPEC CULT: NORMAL
SERVICE CMNT-IMP: NORMAL

## 2023-02-08 ENCOUNTER — HOSPITAL ENCOUNTER (EMERGENCY)
Age: 23
Discharge: HOME | End: 2023-02-08
Attending: EMERGENCY MEDICINE
Payer: MEDICAID

## 2023-02-08 VITALS
TEMPERATURE: 98.5 F | DIASTOLIC BLOOD PRESSURE: 77 MMHG | WEIGHT: 126 LBS | OXYGEN SATURATION: 99 % | SYSTOLIC BLOOD PRESSURE: 118 MMHG | RESPIRATION RATE: 18 BRPM | BODY MASS INDEX: 27.27 KG/M2 | HEART RATE: 93 BPM

## 2023-02-08 DIAGNOSIS — J06.9 UPPER RESPIRATORY TRACT INFECTION, UNSPECIFIED TYPE: Primary | ICD-10-CM

## 2023-02-08 LAB
SARS-COV-2 RDRP RESP QL NAA+PROBE: NOT DETECTED
SOURCE, COVRS: NORMAL

## 2023-02-08 PROCEDURE — 99283 EMERGENCY DEPT VISIT LOW MDM: CPT

## 2023-02-08 PROCEDURE — 87635 SARS-COV-2 COVID-19 AMP PRB: CPT

## 2023-02-08 RX ORDER — ONDANSETRON 4 MG/1
4 TABLET, ORALLY DISINTEGRATING ORAL
Qty: 12 TABLET | Refills: 0 | Status: SHIPPED | OUTPATIENT
Start: 2023-02-08

## 2023-02-08 NOTE — ED NOTES
Patient discharged by Dr. Salomón Grace from waiting room. Patient does not appear to be in any acute distress/shows no evidence of clinical instability at this time. Provider has reviewed discharge instructions with the patient/family. The patient/family verbalized understanding instructions as well as need for follow up for any further symptoms. Discharge papers given, education provided, and any questions answered. Patient discharged by provider.

## 2023-02-08 NOTE — Clinical Note
[address]  [facility] EMERGENCY DEPT  [phone]  [phone]    Work/School Note    Date: 2/8/2023    To Whom It May concern:    Luisa Cagle was seen and treated today in the emergency room by the following provider(s):  Attending Provider: Carolina Rush DO. uLisa Cagle is excused from work/school on 2/8/2023 through 2/10/2023. She is medically clear to return to work/school on 2/11/2023.          Sincerely,          Jessica Perez DO

## 2023-02-08 NOTE — ED TRIAGE NOTES
Pt.  is 27 weeks pregnant, +fetal movement, denies vaginal bleeding or abd pain. pt.  has had congestion cough for about 1 week with vomiting. Pt.  was seen at [address] [place] [address] a week ago, was told has a common cold, told to take OTC medications,  has just got worse since was last seen.

## 2023-02-08 NOTE — ED PROVIDER NOTES
Date of Service:  23      Patient:  Eveline Braxton    Chief Complaint:  Cough and Nasal Congestion       HPI:  Eveline Braxton is a 25 y.o.  female who presents for evaluation of cough cold and congestion. Patient states for a week she has been congested with a cough which she feels like her nose is stuffy. She also had a cough. No nausea or vomiting Different than her normal nausea and vomiting related to pregnancy. She states that her body feels achy all over. No dysuria or frequency. Patient is  27 weeks pregnant. She denies pregnancy issues. Seen a week ago and told that she was likely has a viral cause/common cold symptoms. She comes in today stating that she wants another opinion. Past Medical History:   Diagnosis Date    Asthma        No past surgical history on file. Family History:   Problem Relation Age of Onset    Asthma Mother     Hypertension Father     Asthma Maternal Grandmother        Social History     Socioeconomic History    Marital status: SINGLE     Spouse name: Not on file    Number of children: Not on file    Years of education: Not on file    Highest education level: Not on file   Occupational History    Not on file   Tobacco Use    Smoking status: Never    Smokeless tobacco: Never   Substance and Sexual Activity    Alcohol use: No    Drug use: Yes     Types: Marijuana    Sexual activity: Never   Other Topics Concern    Not on file   Social History Narrative    Not on file     Social Determinants of Health     Financial Resource Strain: Not on file   Food Insecurity: Not on file   Transportation Needs: Not on file   Physical Activity: Not on file   Stress: Not on file   Social Connections: Not on file   Intimate Partner Violence: Not on file   Housing Stability: Not on file         ALLERGIES: Patient has no known allergies. Review of Systems   All other systems reviewed and are negative.     Vitals:    23 0618   BP: 118/77 Pulse: 93   Resp: 18   Temp: 98.5 °F (36.9 °C)   SpO2: 99%   Weight: 57.2 kg (126 lb)            Physical Exam  Vitals and nursing note reviewed. Constitutional:       Appearance: Normal appearance. HENT:      Head: Normocephalic and atraumatic. Nose: Congestion present. Mouth/Throat:      Mouth: Mucous membranes are moist.      Pharynx: Oropharynx is clear. No oropharyngeal exudate or posterior oropharyngeal erythema. Cardiovascular:      Rate and Rhythm: Normal rate. Pulmonary:      Effort: Pulmonary effort is normal.      Breath sounds: Normal breath sounds. Abdominal:      Comments: gravid   Musculoskeletal:         General: No deformity. Skin:     General: Skin is warm. Neurological:      Mental Status: She is alert and oriented to person, place, and time. Psychiatric:         Mood and Affect: Mood normal.        Medical Decision Making  Risk  Prescription drug management. VITAL SIGNS:  Patient Vitals for the past 4 hrs:   Temp Pulse Resp BP SpO2   02/08/23 0618 98.5 °F (36.9 °C) 93 18 118/77 99 %           LABS:  The Following labs have been ordered while in the emergency department and I have independently evaluated them. Recent Results (from the past 6 hour(s))   COVID-19 RAPID TEST    Collection Time: 02/08/23  6:23 AM   Result Value Ref Range    Specimen source Nasopharyngeal      COVID-19 rapid test Not detected NOTD            IMAGING:  The Following imaging studies have been ordered while in the emergency department and I have reviewed them. No orders to display         Medications During Visit:  I ordered/approved the ordering of the following medicines for the patient while in the emergency department. Medications - No data to display      DECISION MAKING:  Nina Hussein is a 25 y.o. female who comes in as above. Viral cause of multitude of symptoms. Being pregnant limits treatment. Supportive care and follow up with pcp       IMPRESSION:  1. Upper respiratory tract infection, unspecified type        DISPOSITION:  Discharged      Current Discharge Medication List        START taking these medications    Details   ondansetron (ZOFRAN ODT) 4 mg disintegrating tablet Take 1 Tablet by mouth every eight (8) hours as needed for Nausea for up to 12 doses. Qty: 12 Tablet, Refills: 0  Start date: 2/8/2023              Follow-up Information       Follow up With Specialties Details Why Contact Info    Cuca Dalton MD Pediatric Medicine Schedule an appointment as soon as possible for a visit   [address] [place] [address]  [phone]      Your Specialist  Schedule an appointment as soon as possible for a visit                 The patient is asked to follow-up with their primary care provider in the next several days. They are to call tomorrow for an appointment. The patient is asked to return promptly for any increased concerns or worsening of symptoms. They can return to this emergency department or any other emergency department.       Procedures

## 2023-02-08 NOTE — Clinical Note
[address]  [facility] EMERGENCY DEPT  [address] [id number]  [phone]    Work/School Note    Date: 2/8/2023    To Whom It May concern:    Omi Leger was seen and treated today in the emergency room by the following provider(s):  Attending Provider: Gayle Zhu DO. Omi Leger is excused from work/school on 2/8/2023 through 2/10/2023. She is medically clear to return to work/school on 2/11/2023.          Sincerely,          Zee Leon DO

## 2023-05-11 ENCOUNTER — HOSPITAL ENCOUNTER (OUTPATIENT)
Facility: HOSPITAL | Age: 23
Setting detail: OBSERVATION
Discharge: HOME | DRG: 540 | End: 2023-05-11
Attending: OBSTETRICS & GYNECOLOGY | Admitting: OBSTETRICS & GYNECOLOGY
Payer: MEDICAID

## 2023-05-11 VITALS
RESPIRATION RATE: 16 BRPM | BODY MASS INDEX: 28.25 KG/M2 | HEIGHT: 56 IN | OXYGEN SATURATION: 98 % | TEMPERATURE: 97.5 F | SYSTOLIC BLOOD PRESSURE: 109 MMHG | HEART RATE: 100 BPM | DIASTOLIC BLOOD PRESSURE: 66 MMHG

## 2023-05-11 PROBLEM — Z3A.39 39 WEEKS GESTATION OF PREGNANCY: Status: ACTIVE | Noted: 2023-05-11

## 2023-05-11 PROBLEM — O47.1 FALSE LABOR AFTER 37 COMPLETED WEEKS OF GESTATION: Status: ACTIVE | Noted: 2023-05-11

## 2023-05-11 PROBLEM — O98.911 PREGNANCY AND INFECTIOUS DISEASE, FIRST TRIMESTER: Status: RESOLVED | Noted: 2023-05-11 | Resolved: 2023-05-11

## 2023-05-11 PROBLEM — O98.911 PREGNANCY AND INFECTIOUS DISEASE, FIRST TRIMESTER: Status: ACTIVE | Noted: 2023-05-11

## 2023-05-11 LAB
BASOPHILS # BLD: 0 K/UL (ref 0–0.1)
BASOPHILS NFR BLD: 0 % (ref 0–1)
DIFFERENTIAL METHOD BLD: ABNORMAL
EOSINOPHIL # BLD: 0.2 K/UL (ref 0–0.4)
EOSINOPHIL NFR BLD: 1 % (ref 0–7)
ERYTHROCYTE [DISTWIDTH] IN BLOOD BY AUTOMATED COUNT: 13.4 % (ref 11.5–14.5)
HCT VFR BLD AUTO: 33 % (ref 35–47)
HGB BLD-MCNC: 11.1 G/DL (ref 11.5–16)
IMM GRANULOCYTES # BLD AUTO: 0.3 K/UL (ref 0–0.04)
IMM GRANULOCYTES NFR BLD AUTO: 2 % (ref 0–0.5)
LYMPHOCYTES # BLD: 2.4 K/UL (ref 0.8–3.5)
LYMPHOCYTES NFR BLD: 16 % (ref 12–49)
MCH RBC QN AUTO: 30.3 PG (ref 26–34)
MCHC RBC AUTO-ENTMCNC: 33.6 G/DL (ref 30–36.5)
MCV RBC AUTO: 90.2 FL (ref 80–99)
MONOCYTES # BLD: 1.4 K/UL (ref 0–1)
MONOCYTES NFR BLD: 9 % (ref 5–13)
NEUTS SEG # BLD: 10.8 K/UL (ref 1.8–8)
NEUTS SEG NFR BLD: 72 % (ref 32–75)
NRBC # BLD: 0 K/UL (ref 0–0.01)
NRBC BLD-RTO: 0 PER 100 WBC
PLATELET # BLD AUTO: 150 K/UL (ref 150–400)
PMV BLD AUTO: 11.6 FL (ref 8.9–12.9)
RBC # BLD AUTO: 3.66 M/UL (ref 3.8–5.2)
RBC MORPH BLD: ABNORMAL
WBC # BLD AUTO: 15.1 K/UL (ref 3.6–11)

## 2023-05-11 PROCEDURE — 36415 COLL VENOUS BLD VENIPUNCTURE: CPT

## 2023-05-11 PROCEDURE — 4500000002 HC ER NO CHARGE

## 2023-05-11 PROCEDURE — 96375 TX/PRO/DX INJ NEW DRUG ADDON: CPT

## 2023-05-11 PROCEDURE — 6360000002 HC RX W HCPCS: Performed by: OBSTETRICS & GYNECOLOGY

## 2023-05-11 PROCEDURE — 86850 RBC ANTIBODY SCREEN: CPT

## 2023-05-11 PROCEDURE — 86901 BLOOD TYPING SEROLOGIC RH(D): CPT

## 2023-05-11 PROCEDURE — G0378 HOSPITAL OBSERVATION PER HR: HCPCS

## 2023-05-11 PROCEDURE — 2580000003 HC RX 258: Performed by: OBSTETRICS & GYNECOLOGY

## 2023-05-11 PROCEDURE — 85025 COMPLETE CBC W/AUTO DIFF WBC: CPT

## 2023-05-11 PROCEDURE — 96361 HYDRATE IV INFUSION ADD-ON: CPT

## 2023-05-11 PROCEDURE — 96374 THER/PROPH/DIAG INJ IV PUSH: CPT

## 2023-05-11 PROCEDURE — 86900 BLOOD TYPING SEROLOGIC ABO: CPT

## 2023-05-11 RX ORDER — SODIUM CHLORIDE, SODIUM LACTATE, POTASSIUM CHLORIDE, AND CALCIUM CHLORIDE .6; .31; .03; .02 G/100ML; G/100ML; G/100ML; G/100ML
1000 INJECTION, SOLUTION INTRAVENOUS ONCE
Status: COMPLETED | OUTPATIENT
Start: 2023-05-11 | End: 2023-05-11

## 2023-05-11 RX ORDER — PROCHLORPERAZINE EDISYLATE 5 MG/ML
10 INJECTION INTRAMUSCULAR; INTRAVENOUS EVERY 6 HOURS PRN
Status: DISCONTINUED | OUTPATIENT
Start: 2023-05-11 | End: 2023-05-11 | Stop reason: HOSPADM

## 2023-05-11 RX ORDER — SODIUM CHLORIDE, SODIUM LACTATE, POTASSIUM CHLORIDE, CALCIUM CHLORIDE 600; 310; 30; 20 MG/100ML; MG/100ML; MG/100ML; MG/100ML
INJECTION, SOLUTION INTRAVENOUS CONTINUOUS
Status: DISCONTINUED | OUTPATIENT
Start: 2023-05-11 | End: 2023-05-11

## 2023-05-11 RX ADMIN — SODIUM CHLORIDE, POTASSIUM CHLORIDE, SODIUM LACTATE AND CALCIUM CHLORIDE 1000 ML: 600; 310; 30; 20 INJECTION, SOLUTION INTRAVENOUS at 07:40

## 2023-05-11 RX ADMIN — SODIUM CHLORIDE, POTASSIUM CHLORIDE, SODIUM LACTATE AND CALCIUM CHLORIDE 1000 ML: 600; 310; 30; 20 INJECTION, SOLUTION INTRAVENOUS at 02:30

## 2023-05-11 RX ADMIN — BUTORPHANOL TARTRATE 2 MG: 2 INJECTION, SOLUTION INTRAMUSCULAR; INTRAVENOUS at 02:12

## 2023-05-11 RX ADMIN — PROCHLORPERAZINE EDISYLATE 10 MG: 5 INJECTION INTRAMUSCULAR; INTRAVENOUS at 02:07

## 2023-05-11 ASSESSMENT — PAIN DESCRIPTION - LOCATION: LOCATION: ABDOMEN

## 2023-05-11 ASSESSMENT — PAIN SCALES - GENERAL: PAINLEVEL_OUTOF10: 8

## 2023-05-11 ASSESSMENT — PAIN DESCRIPTION - DESCRIPTORS: DESCRIPTORS: DISCOMFORT;CRAMPING

## 2023-05-11 NOTE — H&P
Obstetrics Admission H&P    Marlena Nelson  [id number]  2000    Chief Complaint:  Pregnancy and Contractions    HPI: 25 y.o. female  39w5d with Estimated Date of Delivery: 23  Pregnancy has been complicated by  nothing . Patient complains of severe contractions  for 6 hours. Patient denies vaginal bleeding  and vaginal leaking of fluid . The paitent reports CTXs starting yesterday morning increasing in their frequency and intensity throughout the evening. The patient called at 2200 and presented to the hospital at [facility]. Patient received therapeutic rest overnight with Stadol/Compazine along with IVF hydration. Her Ctxs spaced and she was able to sleep before I woke her at 0700. No VB/LOF. +FM    ROS:  Pertinent items are noted in HPI. OB History          1    Para   0    Term                AB        Living             SAB        IAB        Ectopic        Molar        Multiple        Live Births                  History reviewed. No pertinent past medical history. History reviewed. No pertinent surgical history. Social History     Socioeconomic History    Marital status: Single     Spouse name: Not on file    Number of children: Not on file    Years of education: Not on file    Highest education level: Not on file   Occupational History    Not on file   Tobacco Use    Smoking status: Never    Smokeless tobacco: Never   Substance and Sexual Activity    Alcohol use: Not Currently    Drug use: Never    Sexual activity: Yes   Other Topics Concern    Not on file   Social History Narrative    Not on file     History reviewed. No pertinent family history. No Known Allergies  Prior to Admission Medications   Prescriptions Last Dose Informant Patient Reported? Taking?    ALBUTEROL IN Not Taking  Yes No   Sig: Inhale into the lungs 2 times daily   Patient not taking: Reported on 2023   Prenatal Multivit-Min-Fe-FA (PRE-MARGARITA FORMULA PO) 5/10/2023  Yes Yes   Sig: Take by

## 2023-05-11 NOTE — PROGRESS NOTES
3145: Patient arrived to unit with FOB.     0100: Patient placed on EFM. Patient reports +FM, contractions that occur Q4 minutes, denies VB or LOF.    0145: This RN alerting Leslie Paz MD of patient arrival. Early Rodrigue to initiate IV fluid bolus and administer Stadol PRN. Call for change. 0400: Patient resting in bed, denies pain at this time. 0421: This RN updating Shawn AGUILAR of patient condition. VORB to keep patient for Shawn to assess at 0600. No new orders at this time. 0500: Patient transported to L&D triage area. 0518: Patient placed on EFM. Patient denies pain but feels her \"belly getting tight. \" FOB at bedside. 1372: Bedside and Verbal shift change report given to [facility], RN (oncoming nurse) by Chelsea Fay RN (offgoing nurse). Report included the following information Nurse Handoff Report, Intake/Output, MAR, Recent Results, and Quality Measures.

## 2023-05-11 NOTE — DISCHARGE INSTRUCTIONS
Week 39 of Your Pregnancy: Care Instructions     babies can look different than what you see in pictures or movies. Their heads can be a strange shape right after birth. And they may have swollen eyes and red marks on their faces. You can still get pregnant even if you are breastfeeding. If you don't want to get pregnant, use birth control. Tips for Week 39 of Pregnancy  Prepare to breastfeed. Continue to eat healthy foods. Keep taking your prenatal vitamins during breastfeeding if your doctor recommends it. Talk to your doctor before taking any medicines or supplements. Choose the right birth control for you. Intrauterine devices (IUDs) are placed in the uterus. If you plan ahead, the IUD can be placed right after giving birth. They work for years. Hormonal implants are placed under the skin of the arm. They also work for years. Depo-Provera is a shot. You get it every 3 months. Birth control pills can be used. They're taken every day. Tubal ligation (tying your tubes) and vasectomy are surgeries. They're permanent. Diaphragms, spermicide, and condoms must be used each time you have sex. If you used a diaphragm before, you should get refitted after the baby is born. Follow-up care is a key part of your treatment and safety. Be sure to make and go to all appointments, and call your doctor if you are having problems. It's also a good idea to know your test results and keep a list of the medicines you take. Where can you learn more? Go to http://www.jones.com/ and enter A811 to learn more about \"Week 39 of Your Pregnancy: Care Instructions. \"  Current as of: 2022               Content Version: 13.6  © 3478-6110 [employer]. Care instructions adapted under license by [facility] ([facility]).  If you have questions about a medical condition or this instruction, always ask your healthcare professional. Lovelandeth Erps disclaims any warranty or liability

## 2023-05-11 NOTE — PROGRESS NOTES
0700 - Verbal shift change report given to Shanell Segura (oncoming nurse) by Jeniffer Rebolledo RN (offgoing nurse). Report included the following information Nurse Handoff Report. 0730 - Dr. Laura Marin in room to assess patient. Patient unchanged at 2/50/-2. Patient allowed to be discharged home. Patient states she is getting a head cold, so lots of rest and fluids ordered by Dr. Laura Marin.

## 2023-05-13 ENCOUNTER — ANESTHESIA EVENT (OUTPATIENT)
Facility: HOSPITAL | Age: 23
End: 2023-05-13
Payer: MEDICAID

## 2023-05-13 ENCOUNTER — HOSPITAL ENCOUNTER (INPATIENT)
Facility: HOSPITAL | Age: 23
LOS: 3 days | Discharge: HOME | DRG: 540 | End: 2023-05-16
Attending: OBSTETRICS & GYNECOLOGY | Admitting: OBSTETRICS & GYNECOLOGY
Payer: MEDICAID

## 2023-05-13 ENCOUNTER — ANESTHESIA (OUTPATIENT)
Facility: HOSPITAL | Age: 23
End: 2023-05-13
Payer: MEDICAID

## 2023-05-13 DIAGNOSIS — Z98.891 S/P CESAREAN SECTION: Primary | ICD-10-CM

## 2023-05-13 PROBLEM — Z3A.39 39 WEEKS GESTATION OF PREGNANCY: Status: RESOLVED | Noted: 2023-05-11 | Resolved: 2023-05-13

## 2023-05-13 PROBLEM — Z3A.40 40 WEEKS GESTATION OF PREGNANCY: Status: ACTIVE | Noted: 2023-05-13

## 2023-05-13 PROBLEM — O47.1 FALSE LABOR AFTER 37 COMPLETED WEEKS OF GESTATION: Status: RESOLVED | Noted: 2023-05-11 | Resolved: 2023-05-13

## 2023-05-13 LAB
ABO + RH BLD: NORMAL
ABO + RH BLD: NORMAL
BLOOD GROUP ANTIBODIES SERPL: NORMAL
BLOOD GROUP ANTIBODIES SERPL: NORMAL
ERYTHROCYTE [DISTWIDTH] IN BLOOD BY AUTOMATED COUNT: 13.3 % (ref 11.5–14.5)
HCT VFR BLD AUTO: 33.2 % (ref 35–47)
HGB BLD-MCNC: 11.5 G/DL (ref 11.5–16)
MCH RBC QN AUTO: 30.4 PG (ref 26–34)
MCHC RBC AUTO-ENTMCNC: 34.6 G/DL (ref 30–36.5)
MCV RBC AUTO: 87.8 FL (ref 80–99)
NRBC # BLD: 0.02 K/UL (ref 0–0.01)
NRBC BLD-RTO: 0.1 PER 100 WBC
PLATELET # BLD AUTO: 161 K/UL (ref 150–400)
PMV BLD AUTO: 11.6 FL (ref 8.9–12.9)
RBC # BLD AUTO: 3.78 M/UL (ref 3.8–5.2)
SPECIMEN EXP DATE BLD: NORMAL
SPECIMEN EXP DATE BLD: NORMAL
WBC # BLD AUTO: 15.6 K/UL (ref 3.6–11)

## 2023-05-13 PROCEDURE — 86850 RBC ANTIBODY SCREEN: CPT

## 2023-05-13 PROCEDURE — 2709999900 HC NON-CHARGEABLE SUPPLY: Performed by: OBSTETRICS & GYNECOLOGY

## 2023-05-13 PROCEDURE — 2580000003 HC RX 258: Performed by: OBSTETRICS & GYNECOLOGY

## 2023-05-13 PROCEDURE — 86901 BLOOD TYPING SEROLOGIC RH(D): CPT

## 2023-05-13 PROCEDURE — G0378 HOSPITAL OBSERVATION PER HR: HCPCS

## 2023-05-13 PROCEDURE — 3700000000 HC ANESTHESIA ATTENDED CARE: Performed by: OBSTETRICS & GYNECOLOGY

## 2023-05-13 PROCEDURE — 4500000002 HC ER NO CHARGE

## 2023-05-13 PROCEDURE — 2500000003 HC RX 250 WO HCPCS: Performed by: ANESTHESIOLOGY

## 2023-05-13 PROCEDURE — 4A1HXCZ MONITORING OF PRODUCTS OF CONCEPTION, CARDIAC RATE, EXTERNAL APPROACH: ICD-10-PCS | Performed by: OBSTETRICS & GYNECOLOGY

## 2023-05-13 PROCEDURE — 2500000003 HC RX 250 WO HCPCS: Performed by: NURSE ANESTHETIST, CERTIFIED REGISTERED

## 2023-05-13 PROCEDURE — 6360000002 HC RX W HCPCS: Performed by: ANESTHESIOLOGY

## 2023-05-13 PROCEDURE — 36415 COLL VENOUS BLD VENIPUNCTURE: CPT

## 2023-05-13 PROCEDURE — 6360000002 HC RX W HCPCS: Performed by: OBSTETRICS & GYNECOLOGY

## 2023-05-13 PROCEDURE — 2580000003 HC RX 258: Performed by: ANESTHESIOLOGY

## 2023-05-13 PROCEDURE — 85027 COMPLETE CBC AUTOMATED: CPT

## 2023-05-13 PROCEDURE — 3700000025 EPIDURAL BLOCK: Performed by: ANESTHESIOLOGY

## 2023-05-13 PROCEDURE — 3E0P05Z INTRODUCTION OF ADHESION BARRIER INTO FEMALE REPRODUCTIVE, OPEN APPROACH: ICD-10-PCS | Performed by: OBSTETRICS & GYNECOLOGY

## 2023-05-13 PROCEDURE — 1100000000 HC RM PRIVATE

## 2023-05-13 PROCEDURE — 86900 BLOOD TYPING SEROLOGIC ABO: CPT

## 2023-05-13 PROCEDURE — 10H07YZ INSERTION OF OTHER DEVICE INTO PRODUCTS OF CONCEPTION, VIA NATURAL OR ARTIFICIAL OPENING: ICD-10-PCS | Performed by: OBSTETRICS & GYNECOLOGY

## 2023-05-13 PROCEDURE — 6360000002 HC RX W HCPCS: Performed by: NURSE ANESTHETIST, CERTIFIED REGISTERED

## 2023-05-13 PROCEDURE — 7100000001 HC PACU RECOVERY - ADDTL 15 MIN: Performed by: OBSTETRICS & GYNECOLOGY

## 2023-05-13 PROCEDURE — 7100000000 HC PACU RECOVERY - FIRST 15 MIN: Performed by: OBSTETRICS & GYNECOLOGY

## 2023-05-13 PROCEDURE — 3700000001 HC ADD 15 MINUTES (ANESTHESIA): Performed by: OBSTETRICS & GYNECOLOGY

## 2023-05-13 PROCEDURE — 3609079900 HC CESAREAN SECTION: Performed by: OBSTETRICS & GYNECOLOGY

## 2023-05-13 RX ORDER — SODIUM CHLORIDE 0.9 % (FLUSH) 0.9 %
5-40 SYRINGE (ML) INJECTION EVERY 12 HOURS SCHEDULED
Status: DISCONTINUED | OUTPATIENT
Start: 2023-05-13 | End: 2023-05-16 | Stop reason: HOSPADM

## 2023-05-13 RX ORDER — LANOLIN/MINERAL OIL
LOTION (ML) TOPICAL
Status: DISCONTINUED | OUTPATIENT
Start: 2023-05-13 | End: 2023-05-16 | Stop reason: HOSPADM

## 2023-05-13 RX ORDER — GLYCOPYRROLATE 0.2 MG/ML
INJECTION INTRAMUSCULAR; INTRAVENOUS PRN
Status: DISCONTINUED | OUTPATIENT
Start: 2023-05-13 | End: 2023-05-13 | Stop reason: SDUPTHER

## 2023-05-13 RX ORDER — TRANEXAMIC ACID 100 MG/ML
1000 INJECTION, SOLUTION INTRAVENOUS
Status: DISCONTINUED | OUTPATIENT
Start: 2023-05-13 | End: 2023-05-13

## 2023-05-13 RX ORDER — SWAB
1 SWAB, NON-MEDICATED MISCELLANEOUS DAILY
Status: DISCONTINUED | OUTPATIENT
Start: 2023-05-13 | End: 2023-05-16 | Stop reason: HOSPADM

## 2023-05-13 RX ORDER — LIDOCAINE HYDROCHLORIDE 20 MG/ML
INJECTION, SOLUTION EPIDURAL; INFILTRATION; INTRACAUDAL; PERINEURAL PRN
Status: DISCONTINUED | OUTPATIENT
Start: 2023-05-13 | End: 2023-05-13 | Stop reason: SDUPTHER

## 2023-05-13 RX ORDER — BUPIVACAINE HYDROCHLORIDE 2.5 MG/ML
INJECTION, SOLUTION EPIDURAL; INFILTRATION; INTRACAUDAL PRN
Status: DISCONTINUED | OUTPATIENT
Start: 2023-05-13 | End: 2023-05-13 | Stop reason: SDUPTHER

## 2023-05-13 RX ORDER — ONDANSETRON 2 MG/ML
4 INJECTION INTRAMUSCULAR; INTRAVENOUS EVERY 6 HOURS PRN
Status: DISCONTINUED | OUTPATIENT
Start: 2023-05-13 | End: 2023-05-13 | Stop reason: HOSPADM

## 2023-05-13 RX ORDER — ONDANSETRON 2 MG/ML
4 INJECTION INTRAMUSCULAR; INTRAVENOUS EVERY 6 HOURS PRN
Status: DISCONTINUED | OUTPATIENT
Start: 2023-05-13 | End: 2023-05-16 | Stop reason: HOSPADM

## 2023-05-13 RX ORDER — ACETAMINOPHEN 500 MG
1000 TABLET ORAL EVERY 8 HOURS SCHEDULED
Status: DISCONTINUED | OUTPATIENT
Start: 2023-05-13 | End: 2023-05-16 | Stop reason: HOSPADM

## 2023-05-13 RX ORDER — SODIUM CHLORIDE 9 MG/ML
INJECTION, SOLUTION INTRAVENOUS PRN
Status: DISCONTINUED | OUTPATIENT
Start: 2023-05-13 | End: 2023-05-16 | Stop reason: HOSPADM

## 2023-05-13 RX ORDER — SODIUM CHLORIDE 9 MG/ML
25 INJECTION, SOLUTION INTRAVENOUS PRN
Status: DISCONTINUED | OUTPATIENT
Start: 2023-05-13 | End: 2023-05-13

## 2023-05-13 RX ORDER — SODIUM CHLORIDE 0.9 % (FLUSH) 0.9 %
5-40 SYRINGE (ML) INJECTION PRN
Status: DISCONTINUED | OUTPATIENT
Start: 2023-05-13 | End: 2023-05-14

## 2023-05-13 RX ORDER — DOCUSATE SODIUM 100 MG/1
100 CAPSULE, LIQUID FILLED ORAL 2 TIMES DAILY
Status: DISCONTINUED | OUTPATIENT
Start: 2023-05-13 | End: 2023-05-13

## 2023-05-13 RX ORDER — SODIUM CHLORIDE, SODIUM LACTATE, POTASSIUM CHLORIDE, CALCIUM CHLORIDE 600; 310; 30; 20 MG/100ML; MG/100ML; MG/100ML; MG/100ML
INJECTION, SOLUTION INTRAVENOUS CONTINUOUS
Status: DISCONTINUED | OUTPATIENT
Start: 2023-05-13 | End: 2023-05-13

## 2023-05-13 RX ORDER — SUCCINYLCHOLINE CHLORIDE 20 MG/ML
INJECTION INTRAMUSCULAR; INTRAVENOUS PRN
Status: DISCONTINUED | OUTPATIENT
Start: 2023-05-13 | End: 2023-05-13 | Stop reason: SDUPTHER

## 2023-05-13 RX ORDER — ONDANSETRON 2 MG/ML
4 INJECTION INTRAMUSCULAR; INTRAVENOUS EVERY 6 HOURS PRN
Status: DISCONTINUED | OUTPATIENT
Start: 2023-05-13 | End: 2023-05-13

## 2023-05-13 RX ORDER — SODIUM CHLORIDE, SODIUM LACTATE, POTASSIUM CHLORIDE, AND CALCIUM CHLORIDE .6; .31; .03; .02 G/100ML; G/100ML; G/100ML; G/100ML
1000 INJECTION, SOLUTION INTRAVENOUS PRN
Status: DISCONTINUED | OUTPATIENT
Start: 2023-05-13 | End: 2023-05-13

## 2023-05-13 RX ORDER — OXYTOCIN 10 [USP'U]/ML
INJECTION, SOLUTION INTRAMUSCULAR; INTRAVENOUS PRN
Status: DISCONTINUED | OUTPATIENT
Start: 2023-05-13 | End: 2023-05-13 | Stop reason: SDUPTHER

## 2023-05-13 RX ORDER — ONDANSETRON 2 MG/ML
4 INJECTION INTRAMUSCULAR; INTRAVENOUS
Status: DISCONTINUED | OUTPATIENT
Start: 2023-05-13 | End: 2023-05-13 | Stop reason: HOSPADM

## 2023-05-13 RX ORDER — NEOSTIGMINE METHYLSULFATE 1 MG/ML
INJECTION, SOLUTION INTRAVENOUS PRN
Status: DISCONTINUED | OUTPATIENT
Start: 2023-05-13 | End: 2023-05-13 | Stop reason: SDUPTHER

## 2023-05-13 RX ORDER — IBUPROFEN 600 MG/1
600 TABLET ORAL EVERY 8 HOURS SCHEDULED
Status: DISCONTINUED | OUTPATIENT
Start: 2023-05-14 | End: 2023-05-14

## 2023-05-13 RX ORDER — ROCURONIUM BROMIDE 10 MG/ML
INJECTION, SOLUTION INTRAVENOUS PRN
Status: DISCONTINUED | OUTPATIENT
Start: 2023-05-13 | End: 2023-05-13 | Stop reason: SDUPTHER

## 2023-05-13 RX ORDER — SODIUM CHLORIDE 0.9 % (FLUSH) 0.9 %
5-40 SYRINGE (ML) INJECTION EVERY 12 HOURS SCHEDULED
Status: DISCONTINUED | OUTPATIENT
Start: 2023-05-13 | End: 2023-05-13

## 2023-05-13 RX ORDER — SODIUM CHLORIDE, SODIUM LACTATE, POTASSIUM CHLORIDE, CALCIUM CHLORIDE 600; 310; 30; 20 MG/100ML; MG/100ML; MG/100ML; MG/100ML
INJECTION, SOLUTION INTRAVENOUS CONTINUOUS PRN
Status: DISCONTINUED | OUTPATIENT
Start: 2023-05-13 | End: 2023-05-13 | Stop reason: SDUPTHER

## 2023-05-13 RX ORDER — NALOXONE HYDROCHLORIDE 0.4 MG/ML
INJECTION, SOLUTION INTRAMUSCULAR; INTRAVENOUS; SUBCUTANEOUS PRN
Status: DISCONTINUED | OUTPATIENT
Start: 2023-05-13 | End: 2023-05-14

## 2023-05-13 RX ORDER — METHYLERGONOVINE MALEATE 0.2 MG/ML
200 INJECTION INTRAVENOUS PRN
Status: DISCONTINUED | OUTPATIENT
Start: 2023-05-13 | End: 2023-05-13

## 2023-05-13 RX ORDER — OXYCODONE HYDROCHLORIDE 5 MG/1
10 TABLET ORAL EVERY 4 HOURS PRN
Status: DISCONTINUED | OUTPATIENT
Start: 2023-05-13 | End: 2023-05-16 | Stop reason: HOSPADM

## 2023-05-13 RX ORDER — HYDROMORPHONE HYDROCHLORIDE 1 MG/ML
1 INJECTION, SOLUTION INTRAMUSCULAR; INTRAVENOUS; SUBCUTANEOUS
Status: DISCONTINUED | OUTPATIENT
Start: 2023-05-13 | End: 2023-05-14

## 2023-05-13 RX ORDER — DIPHENHYDRAMINE HYDROCHLORIDE 50 MG/ML
12.5 INJECTION INTRAMUSCULAR; INTRAVENOUS
Status: DISCONTINUED | OUTPATIENT
Start: 2023-05-13 | End: 2023-05-13 | Stop reason: HOSPADM

## 2023-05-13 RX ORDER — OXYCODONE HYDROCHLORIDE 5 MG/1
5 TABLET ORAL EVERY 4 HOURS PRN
Status: DISCONTINUED | OUTPATIENT
Start: 2023-05-13 | End: 2023-05-16 | Stop reason: HOSPADM

## 2023-05-13 RX ORDER — IBUPROFEN 600 MG/1
600 TABLET ORAL EVERY 8 HOURS SCHEDULED
Status: DISCONTINUED | OUTPATIENT
Start: 2023-05-13 | End: 2023-05-13

## 2023-05-13 RX ORDER — MISOPROSTOL 200 UG/1
800 TABLET ORAL PRN
Status: DISCONTINUED | OUTPATIENT
Start: 2023-05-13 | End: 2023-05-13

## 2023-05-13 RX ORDER — BUPIVACAINE HYDROCHLORIDE 7.5 MG/ML
INJECTION, SOLUTION EPIDURAL; RETROBULBAR PRN
Status: DISCONTINUED | OUTPATIENT
Start: 2023-05-13 | End: 2023-05-13 | Stop reason: SDUPTHER

## 2023-05-13 RX ORDER — DIPHENHYDRAMINE HYDROCHLORIDE 50 MG/ML
25 INJECTION INTRAMUSCULAR; INTRAVENOUS EVERY 6 HOURS PRN
Status: DISCONTINUED | OUTPATIENT
Start: 2023-05-13 | End: 2023-05-16 | Stop reason: HOSPADM

## 2023-05-13 RX ORDER — CARBOPROST TROMETHAMINE 250 UG/ML
250 INJECTION, SOLUTION INTRAMUSCULAR PRN
Status: DISCONTINUED | OUTPATIENT
Start: 2023-05-13 | End: 2023-05-13

## 2023-05-13 RX ORDER — DOCUSATE SODIUM 100 MG/1
100 CAPSULE, LIQUID FILLED ORAL 2 TIMES DAILY
Status: DISCONTINUED | OUTPATIENT
Start: 2023-05-13 | End: 2023-05-16 | Stop reason: HOSPADM

## 2023-05-13 RX ORDER — NALOXONE HYDROCHLORIDE 0.4 MG/ML
INJECTION, SOLUTION INTRAMUSCULAR; INTRAVENOUS; SUBCUTANEOUS PRN
Status: DISCONTINUED | OUTPATIENT
Start: 2023-05-13 | End: 2023-05-13 | Stop reason: HOSPADM

## 2023-05-13 RX ORDER — MORPHINE SULFATE 1 MG/ML
INJECTION, SOLUTION EPIDURAL; INTRATHECAL; INTRAVENOUS PRN
Status: DISCONTINUED | OUTPATIENT
Start: 2023-05-13 | End: 2023-05-13 | Stop reason: SDUPTHER

## 2023-05-13 RX ORDER — SODIUM CHLORIDE, SODIUM LACTATE, POTASSIUM CHLORIDE, CALCIUM CHLORIDE 600; 310; 30; 20 MG/100ML; MG/100ML; MG/100ML; MG/100ML
INJECTION, SOLUTION INTRAVENOUS CONTINUOUS
Status: DISCONTINUED | OUTPATIENT
Start: 2023-05-13 | End: 2023-05-16 | Stop reason: HOSPADM

## 2023-05-13 RX ORDER — SODIUM CHLORIDE, SODIUM LACTATE, POTASSIUM CHLORIDE, AND CALCIUM CHLORIDE .6; .31; .03; .02 G/100ML; G/100ML; G/100ML; G/100ML
500 INJECTION, SOLUTION INTRAVENOUS PRN
Status: DISCONTINUED | OUTPATIENT
Start: 2023-05-13 | End: 2023-05-13

## 2023-05-13 RX ORDER — SODIUM CHLORIDE 0.9 % (FLUSH) 0.9 %
5-40 SYRINGE (ML) INJECTION PRN
Status: DISCONTINUED | OUTPATIENT
Start: 2023-05-13 | End: 2023-05-13

## 2023-05-13 RX ORDER — FENTANYL 0.2 MG/100ML-BUPIV 0.125%-NACL 0.9% EPIDURAL INJ 2/0.125 MCG/ML-%
10 SOLUTION INJECTION CONTINUOUS
Status: DISCONTINUED | OUTPATIENT
Start: 2023-05-13 | End: 2023-05-13

## 2023-05-13 RX ORDER — KETOROLAC TROMETHAMINE 30 MG/ML
30 INJECTION, SOLUTION INTRAMUSCULAR; INTRAVENOUS EVERY 6 HOURS
Status: DISCONTINUED | OUTPATIENT
Start: 2023-05-13 | End: 2023-05-14

## 2023-05-13 RX ADMIN — Medication 10 ML/HR: at 12:32

## 2023-05-13 RX ADMIN — SODIUM CHLORIDE, POTASSIUM CHLORIDE, SODIUM LACTATE AND CALCIUM CHLORIDE: 600; 310; 30; 20 INJECTION, SOLUTION INTRAVENOUS at 21:13

## 2023-05-13 RX ADMIN — LIDOCAINE HYDROCHLORIDE 10 ML: 20 INJECTION, SOLUTION EPIDURAL; INFILTRATION; INTRACAUDAL; PERINEURAL at 16:40

## 2023-05-13 RX ADMIN — BUPIVACAINE HYDROCHLORIDE 4 MG: 2.5 INJECTION, SOLUTION EPIDURAL; INFILTRATION; INTRACAUDAL; PERINEURAL at 07:50

## 2023-05-13 RX ADMIN — LIDOCAINE HYDROCHLORIDE 2.5 ML: 20 INJECTION, SOLUTION EPIDURAL; INFILTRATION; INTRACAUDAL; PERINEURAL at 02:00

## 2023-05-13 RX ADMIN — BUPIVACAINE HYDROCHLORIDE 5 MG: 2.5 INJECTION, SOLUTION EPIDURAL; INFILTRATION; INTRACAUDAL; PERINEURAL at 01:28

## 2023-05-13 RX ADMIN — BUPIVACAINE HYDROCHLORIDE 1.4 DROP: 7.5 INJECTION, SOLUTION EPIDURAL; RETROBULBAR at 17:35

## 2023-05-13 RX ADMIN — CEFAZOLIN SODIUM 2000 MG: 1 POWDER, FOR SOLUTION INTRAMUSCULAR; INTRAVENOUS at 17:36

## 2023-05-13 RX ADMIN — MORPHINE SULFATE 0.2 MG: 1 INJECTION, SOLUTION EPIDURAL; INTRATHECAL; INTRAVENOUS at 17:53

## 2023-05-13 RX ADMIN — OXYTOCIN 2 MILLI-UNITS/MIN: 10 INJECTION INTRAVENOUS at 11:48

## 2023-05-13 RX ADMIN — SODIUM CHLORIDE, POTASSIUM CHLORIDE, SODIUM LACTATE AND CALCIUM CHLORIDE: 600; 310; 30; 20 INJECTION, SOLUTION INTRAVENOUS at 02:00

## 2023-05-13 RX ADMIN — HYDROMORPHONE HYDROCHLORIDE 0.5 MG: 1 INJECTION, SOLUTION INTRAMUSCULAR; INTRAVENOUS; SUBCUTANEOUS at 19:15

## 2023-05-13 RX ADMIN — BUPIVACAINE HYDROCHLORIDE 3 MG: 2.5 INJECTION, SOLUTION EPIDURAL; INFILTRATION; INTRACAUDAL; PERINEURAL at 01:33

## 2023-05-13 RX ADMIN — GLYCOPYRROLATE 0.1 MG: 0.2 INJECTION INTRAMUSCULAR; INTRAVENOUS at 18:40

## 2023-05-13 RX ADMIN — Medication 10 ML/HR: at 01:48

## 2023-05-13 RX ADMIN — SODIUM CHLORIDE, POTASSIUM CHLORIDE, SODIUM LACTATE AND CALCIUM CHLORIDE: 600; 310; 30; 20 INJECTION, SOLUTION INTRAVENOUS at 17:53

## 2023-05-13 RX ADMIN — ONDANSETRON 4 MG: 2 INJECTION INTRAMUSCULAR; INTRAVENOUS at 12:57

## 2023-05-13 RX ADMIN — SUCCINYLCHOLINE CHLORIDE 80 MG: 20 INJECTION, SOLUTION INTRAMUSCULAR; INTRAVENOUS at 18:04

## 2023-05-13 RX ADMIN — NEOSTIGMINE METHYLSULFATE 1 MG: 1 INJECTION, SOLUTION INTRAVENOUS at 18:40

## 2023-05-13 RX ADMIN — BUPIVACAINE HYDROCHLORIDE 5 ML: 2.5 INJECTION, SOLUTION EPIDURAL; INFILTRATION; INTRACAUDAL; PERINEURAL at 13:25

## 2023-05-13 RX ADMIN — BUPIVACAINE HYDROCHLORIDE 2.5 MG: 2.5 INJECTION, SOLUTION EPIDURAL; INFILTRATION; INTRACAUDAL; PERINEURAL at 02:00

## 2023-05-13 RX ADMIN — MORPHINE SULFATE 2 MG: 1 INJECTION, SOLUTION EPIDURAL; INTRATHECAL; INTRAVENOUS at 18:13

## 2023-05-13 RX ADMIN — MORPHINE SULFATE 2 MG: 1 INJECTION, SOLUTION EPIDURAL; INTRATHECAL; INTRAVENOUS at 18:54

## 2023-05-13 RX ADMIN — SODIUM CHLORIDE, POTASSIUM CHLORIDE, SODIUM LACTATE AND CALCIUM CHLORIDE: 600; 310; 30; 20 INJECTION, SOLUTION INTRAVENOUS at 09:00

## 2023-05-13 RX ADMIN — AZITHROMYCIN DIHYDRATE 500 MG: 500 INJECTION, POWDER, LYOPHILIZED, FOR SOLUTION INTRAVENOUS at 17:37

## 2023-05-13 RX ADMIN — BUPIVACAINE HYDROCHLORIDE 4 ML: 2.5 INJECTION, SOLUTION EPIDURAL; INFILTRATION; INTRACAUDAL; PERINEURAL at 09:10

## 2023-05-13 RX ADMIN — ONDANSETRON 4 MG: 2 INJECTION INTRAMUSCULAR; INTRAVENOUS at 17:41

## 2023-05-13 RX ADMIN — KETOROLAC TROMETHAMINE 30 MG: 30 INJECTION, SOLUTION INTRAMUSCULAR; INTRAVENOUS at 21:00

## 2023-05-13 RX ADMIN — HYDROMORPHONE HYDROCHLORIDE 0.5 MG: 1 INJECTION, SOLUTION INTRAMUSCULAR; INTRAVENOUS; SUBCUTANEOUS at 19:25

## 2023-05-13 RX ADMIN — ROCURONIUM BROMIDE 10 MG: 10 INJECTION INTRAVENOUS at 18:15

## 2023-05-13 RX ADMIN — Medication: at 21:15

## 2023-05-13 RX ADMIN — Medication 10 ML/HR: at 07:06

## 2023-05-13 RX ADMIN — BUPIVACAINE HYDROCHLORIDE 1.2 ML: 7.5 INJECTION, SOLUTION EPIDURAL; RETROBULBAR at 17:53

## 2023-05-13 RX ADMIN — OXYTOCIN 30 UNITS: 10 INJECTION, SOLUTION INTRAMUSCULAR; INTRAVENOUS at 18:10

## 2023-05-13 RX ADMIN — LIDOCAINE HYDROCHLORIDE 5 ML: 20 INJECTION, SOLUTION EPIDURAL; INFILTRATION; INTRACAUDAL; PERINEURAL at 13:25

## 2023-05-13 RX ADMIN — LIDOCAINE HYDROCHLORIDE 4 ML: 20 INJECTION, SOLUTION EPIDURAL; INFILTRATION; INTRACAUDAL; PERINEURAL at 07:50

## 2023-05-13 RX ADMIN — MORPHINE SULFATE 0.2 MG: 1 INJECTION, SOLUTION EPIDURAL; INTRATHECAL; INTRAVENOUS at 17:35

## 2023-05-13 RX ADMIN — LIDOCAINE HYDROCHLORIDE 8 ML: 20 INJECTION, SOLUTION EPIDURAL; INFILTRATION; INTRACAUDAL; PERINEURAL at 17:00

## 2023-05-13 RX ADMIN — MORPHINE SULFATE 2 MG: 1 INJECTION, SOLUTION EPIDURAL; INTRATHECAL; INTRAVENOUS at 18:37

## 2023-05-13 RX ADMIN — HYDROMORPHONE HYDROCHLORIDE 0.5 MG: 1 INJECTION, SOLUTION INTRAMUSCULAR; INTRAVENOUS; SUBCUTANEOUS at 19:32

## 2023-05-13 ASSESSMENT — PAIN DESCRIPTION - ORIENTATION
ORIENTATION: MID
ORIENTATION: MID;LOWER
ORIENTATION: MID
ORIENTATION: MID

## 2023-05-13 ASSESSMENT — PAIN SCALES - GENERAL
PAINLEVEL_OUTOF10: 5
PAINLEVEL_OUTOF10: 5
PAINLEVEL_OUTOF10: 4
PAINLEVEL_OUTOF10: 10

## 2023-05-13 ASSESSMENT — PAIN DESCRIPTION - LOCATION
LOCATION: ABDOMEN

## 2023-05-13 ASSESSMENT — PAIN DESCRIPTION - DESCRIPTORS
DESCRIPTORS: SHARP;STABBING
DESCRIPTORS: CRAMPING
DESCRIPTORS: CRAMPING
DESCRIPTORS: CRAMPING;SHARP
DESCRIPTORS: CRAMPING

## 2023-05-13 ASSESSMENT — PAIN DESCRIPTION - FREQUENCY: FREQUENCY: INTERMITTENT

## 2023-05-13 NOTE — ANESTHESIA PROCEDURE NOTES
Epidural Block    Patient location during procedure: OB  Start time: 5/13/2023 8:53 AM  End time: 5/13/2023 7:12 PM  Reason for block: labor epidural  Staffing  Performed: anesthesiologist   Anesthesiologist: Evan Rodriges MD  Epidural  Patient position: sitting  Prep: Betadine  Patient monitoring: frequent blood pressure checks and continuous pulse ox  Approach: midline  Location: L3-4  Injection technique: ASHOK saline  Provider prep: sterile gloves  Needle  Needle type: Tuohy   Needle gauge: 17 G  Needle insertion depth: 7 cm  Catheter type: multi-orifice  Catheter size: 20 G  Test dose: negativeCatheter Secured: tegaderm and tape  Assessment  Attempts: 1  Outcomes: patient tolerated procedure well  Preanesthetic Checklist  Completed: patient identified, timeout performed and anesthesia consent given

## 2023-05-13 NOTE — ANESTHESIA PROCEDURE NOTES
Epidural Block    Patient location during procedure: OB  Start time: 5/13/2023 1:12 AM  End time: 5/13/2023 1:25 AM  Reason for block: labor epidural  Staffing  Performed: resident/CRNA   Resident/CRNA: MARCELA Parra CRNA  Epidural  Patient position: sitting  Prep: ChloraPrep  Patient monitoring: continuous pulse ox, frequent blood pressure checks and cardiac monitor  Approach: midline  Location: L4-5  Injection technique: ASHOK saline  Provider prep: mask and sterile gloves  Needle  Needle type: Tuohy   Needle gauge: 17 G  Needle length: 3.5 in  Needle insertion depth: 5 cm  Catheter type: multi-orifice  Catheter size: 20 G  Catheter at skin depth: 10 cm  Test dose: negativeCatheter Secured: tegaderm and tape  Assessment  Hemodynamics: stable  Attempts: 1  Outcomes: uncomplicated and patient tolerated procedure well  Preanesthetic Checklist  Completed: patient identified, IV checked, site marked, risks and benefits discussed, surgical/procedural consents, equipment checked, pre-op evaluation, timeout performed, anesthesia consent given, oxygen available, monitors applied/VS acknowledged, fire risk safety assessment completed and verbalized and blood product R/B/A discussed and consented

## 2023-05-13 NOTE — ANESTHESIA POSTPROCEDURE EVALUATION
Department of Anesthesiology  Postprocedure Note    Patient: Marlena Nelson  MRN: [id number]  YOB: 2000  Date of evaluation: 2023      Procedure Summary     Date: 23 Room / Location: [facility] L&D 02 / [facility] L&D OR    Anesthesia Start: 110 Anesthesia Stop:     Procedures:        SECTION (Abdomen)      Labor Analgesia Diagnosis:       Failure to progress in second stage of labor      (Intrauterine pregnancy)    Surgeons: Lynetta Hatchet, MD Responsible Provider: Ally Joy MD    Anesthesia Type: General ASA Status: 2          Anesthesia Type: General    Daylin Phase I: Daylin Score: 8    Daylin Phase II:        Anesthesia Post Evaluation    Patient location during evaluation: PACU  Patient participation: complete - patient participated  Level of consciousness: awake  Airway patency: patent  Nausea & Vomiting: no vomiting and no nausea  Complications: no  Cardiovascular status: hemodynamically stable  Respiratory status: acceptable  Hydration status: stable

## 2023-05-13 NOTE — ANESTHESIA PROCEDURE NOTES
Spinal Block    Patient location during procedure: OB  End time: 5/13/2023 5:35 PM  Reason for block: post-op pain management, primary anesthetic and at surgeon's request  Staffing  Performed: anesthesiologist   Anesthesiologist: Berry Patel MD  Spinal Block  Patient position: sitting  Prep: DuraPrep  Patient monitoring: cardiac monitor, continuous pulse ox and frequent blood pressure checks  Approach: midline  Location: L3/L4  Provider prep: mask and sterile gloves  Local infiltration: lidocaine  Needle  Needle type: Pencan   Needle gauge: 25 G  Assessment  Swirl obtained: Yes  CSF: clear  Hemodynamics: stable  Preanesthetic Checklist  Completed: patient identified, IV checked, risks and benefits discussed, surgical/procedural consents, pre-op evaluation, timeout performed, anesthesia consent given, oxygen available and monitors applied/VS acknowledged

## 2023-05-13 NOTE — ANESTHESIA PROCEDURE NOTES
Spinal Block    Patient location during procedure: OB  End time: 5/13/2023 5:51 PM  Reason for block: post-op pain management, primary anesthetic and at surgeon's request  Staffing  Performed: anesthesiologist   Anesthesiologist: Kiran Pichardo MD  Spinal Block  Patient position: sitting  Prep: DuraPrep  Patient monitoring: cardiac monitor, continuous pulse ox and frequent blood pressure checks  Approach: midline  Location: L3/L4  Provider prep: mask and sterile gloves  Local infiltration: lidocaine  Needle  Needle type: Pencan   Needle gauge: 25 G  Assessment  Swirl obtained: Yes  CSF: clear  Hemodynamics: stable  Preanesthetic Checklist  Completed: patient identified, IV checked, risks and benefits discussed, surgical/procedural consents, pre-op evaluation, timeout performed, anesthesia consent given, oxygen available and monitors applied/VS acknowledged

## 2023-05-13 NOTE — ANESTHESIA PRE PROCEDURE
Department of Anesthesiology  Preprocedure Note       Name:  Valerie Melgoza   Age:  25 y.o.  :  2000                                          MRN:  [id number]         Date:  2023      Surgeon: Blaise Sullivan):  Jacob Avendaño MD    Procedure: Procedure(s):   SECTION    Medications prior to admission:   Prior to Admission medications    Medication Sig Start Date End Date Taking?  Authorizing Provider   Prenatal Multivit-Min-Fe-FA (PRE-MARGARITA FORMULA PO) Take by mouth    Historical Provider, MD       Current medications:    Current Facility-Administered Medications   Medication Dose Route Frequency Provider Last Rate Last Admin    fentaNYL 2 mcg/mL bupivacaine 0.125% in sodium chloride 0.9% 100 mL epidural infusion  10 mL/hr Epidural Continuous Fredirick Level, APRN - CRNA   Stopped at 23 1649    naloxone 0.4 mg in 10 mL sodium chloride syringe   IntraVENous PRN Fredirick Level, APRN - CRNA        ondansetron [facility] injection 4 mg  4 mg IntraVENous Q6H PRN Fredirick Level, APRN - CRNA   4 mg at 23 1741    lactated ringers IV soln infusion   IntraVENous Continuous Jacob Avendaño  mL/hr at 23 0900 New Bag at 23 0900    lactated ringers bolus  500 mL IntraVENous PRN Jacob Avendaño .8 mL/hr at 23 1650 Rate Change at 23 1650    Or    lactated ringers bolus  1,000 mL IntraVENous PRN Jacob Avendaño MD        sodium chloride flush 0.9 % injection 5-40 mL  5-40 mL IntraVENous 2 times per day Jacob Avendaño MD        sodium chloride flush 0.9 % injection 5-40 mL  5-40 mL IntraVENous PRN Jacob Avendaño MD        0.9 % sodium chloride infusion  25 mL IntraVENous PRN Jacob Avendaño MD        methylergonovine (METHERGINE) injection 200 mcg  200 mcg IntraMUSCular PRN Jacob Avendaño MD        carboprost (HEMABATE) injection 250 mcg  250 mcg IntraMUSCular PRN Jacob Avendaño MD        miSOPROStol

## 2023-05-14 LAB
ERYTHROCYTE [DISTWIDTH] IN BLOOD BY AUTOMATED COUNT: 13.3 % (ref 11.5–14.5)
HCT VFR BLD AUTO: 27 % (ref 35–47)
HGB BLD-MCNC: 9.2 G/DL (ref 11.5–16)
MCH RBC QN AUTO: 29.9 PG (ref 26–34)
MCHC RBC AUTO-ENTMCNC: 34.1 G/DL (ref 30–36.5)
MCV RBC AUTO: 87.7 FL (ref 80–99)
NRBC # BLD: 0 K/UL (ref 0–0.01)
NRBC BLD-RTO: 0 PER 100 WBC
PLATELET # BLD AUTO: 145 K/UL (ref 150–400)
PMV BLD AUTO: 11.4 FL (ref 8.9–12.9)
RBC # BLD AUTO: 3.08 M/UL (ref 3.8–5.2)
WBC # BLD AUTO: 21.3 K/UL (ref 3.6–11)

## 2023-05-14 PROCEDURE — 2580000003 HC RX 258: Performed by: OBSTETRICS & GYNECOLOGY

## 2023-05-14 PROCEDURE — 6360000002 HC RX W HCPCS: Performed by: OBSTETRICS & GYNECOLOGY

## 2023-05-14 PROCEDURE — 1100000000 HC RM PRIVATE

## 2023-05-14 PROCEDURE — 96372 THER/PROPH/DIAG INJ SC/IM: CPT

## 2023-05-14 PROCEDURE — 85461 HEMOGLOBIN FETAL: CPT

## 2023-05-14 PROCEDURE — 85027 COMPLETE CBC AUTOMATED: CPT

## 2023-05-14 PROCEDURE — 86901 BLOOD TYPING SEROLOGIC RH(D): CPT

## 2023-05-14 PROCEDURE — 36415 COLL VENOUS BLD VENIPUNCTURE: CPT

## 2023-05-14 PROCEDURE — 6370000000 HC RX 637 (ALT 250 FOR IP): Performed by: OBSTETRICS & GYNECOLOGY

## 2023-05-14 PROCEDURE — 86900 BLOOD TYPING SEROLOGIC ABO: CPT

## 2023-05-14 RX ORDER — SIMETHICONE 80 MG
80 TABLET,CHEWABLE ORAL 4 TIMES DAILY PRN
Status: DISCONTINUED | OUTPATIENT
Start: 2023-05-14 | End: 2023-05-16 | Stop reason: HOSPADM

## 2023-05-14 RX ORDER — IBUPROFEN 600 MG/1
600 TABLET ORAL EVERY 8 HOURS SCHEDULED
Status: DISCONTINUED | OUTPATIENT
Start: 2023-05-14 | End: 2023-05-15

## 2023-05-14 RX ADMIN — CEFAZOLIN 1000 MG: 1 INJECTION, POWDER, FOR SOLUTION INTRAMUSCULAR; INTRAVENOUS at 08:52

## 2023-05-14 RX ADMIN — Medication 1 TABLET: at 08:52

## 2023-05-14 RX ADMIN — IBUPROFEN 600 MG: 600 TABLET ORAL at 09:08

## 2023-05-14 RX ADMIN — OXYCODONE 5 MG: 5 TABLET ORAL at 12:59

## 2023-05-14 RX ADMIN — OXYCODONE 10 MG: 5 TABLET ORAL at 17:04

## 2023-05-14 RX ADMIN — HUMAN RHO(D) IMMUNE GLOBULIN 300 MCG: 1500 SOLUTION INTRAMUSCULAR; INTRAVENOUS at 16:57

## 2023-05-14 RX ADMIN — DOCUSATE SODIUM 100 MG: 100 CAPSULE, LIQUID FILLED ORAL at 08:56

## 2023-05-14 RX ADMIN — CEFAZOLIN 1000 MG: 1 INJECTION, POWDER, FOR SOLUTION INTRAMUSCULAR; INTRAVENOUS at 01:15

## 2023-05-14 RX ADMIN — ACETAMINOPHEN 1000 MG: 500 TABLET ORAL at 22:41

## 2023-05-14 RX ADMIN — OXYCODONE 5 MG: 5 TABLET ORAL at 08:51

## 2023-05-14 RX ADMIN — ACETAMINOPHEN 1000 MG: 500 TABLET ORAL at 14:28

## 2023-05-14 RX ADMIN — ACETAMINOPHEN 1000 MG: 500 TABLET ORAL at 06:35

## 2023-05-14 RX ADMIN — IBUPROFEN 600 MG: 600 TABLET ORAL at 17:05

## 2023-05-14 RX ADMIN — OXYCODONE 10 MG: 5 TABLET ORAL at 21:13

## 2023-05-14 RX ADMIN — SODIUM CHLORIDE, POTASSIUM CHLORIDE, SODIUM LACTATE AND CALCIUM CHLORIDE: 600; 310; 30; 20 INJECTION, SOLUTION INTRAVENOUS at 05:27

## 2023-05-14 RX ADMIN — SIMETHICONE 80 MG: 80 TABLET, CHEWABLE ORAL at 14:28

## 2023-05-14 RX ADMIN — KETOROLAC TROMETHAMINE 30 MG: 30 INJECTION, SOLUTION INTRAMUSCULAR; INTRAVENOUS at 03:04

## 2023-05-14 ASSESSMENT — PAIN DESCRIPTION - DESCRIPTORS
DESCRIPTORS: ACHING;SHARP;PRESSURE
DESCRIPTORS: ACHING;CRAMPING
DESCRIPTORS: ACHING;SORE
DESCRIPTORS: ACHING;CRAMPING;SORE
DESCRIPTORS: ACHING;CRAMPING
DESCRIPTORS: ACHING
DESCRIPTORS: ACHING;PRESSURE
DESCRIPTORS: ACHING;SORE;BURNING

## 2023-05-14 ASSESSMENT — PAIN DESCRIPTION - ORIENTATION
ORIENTATION: ANTERIOR;LOWER
ORIENTATION: LOWER
ORIENTATION: LOWER
ORIENTATION: ANTERIOR;LOWER
ORIENTATION: LOWER
ORIENTATION: ANTERIOR;LOWER

## 2023-05-14 ASSESSMENT — PAIN DESCRIPTION - LOCATION
LOCATION: ABDOMEN
LOCATION: ABDOMEN;INCISION
LOCATION: ABDOMEN
LOCATION: ABDOMEN

## 2023-05-14 ASSESSMENT — PAIN SCALES - GENERAL
PAINLEVEL_OUTOF10: 5
PAINLEVEL_OUTOF10: 4
PAINLEVEL_OUTOF10: 8
PAINLEVEL_OUTOF10: 3

## 2023-05-15 LAB
ABO + RH BLD: NORMAL
BLD PROD TYP BPU: NORMAL
BLD PROD TYP BPU: NORMAL
BLOOD BANK DISPENSE STATUS: NORMAL
BLOOD BANK DISPENSE STATUS: NORMAL
BPU ID: NORMAL
BPU ID: NORMAL
FETAL SCREEN: NORMAL
UNIT DIVISION: 0
UNIT DIVISION: 0

## 2023-05-15 PROCEDURE — 6370000000 HC RX 637 (ALT 250 FOR IP): Performed by: OBSTETRICS & GYNECOLOGY

## 2023-05-15 PROCEDURE — 1100000000 HC RM PRIVATE

## 2023-05-15 RX ORDER — KETOROLAC TROMETHAMINE 30 MG/ML
30 INJECTION, SOLUTION INTRAMUSCULAR; INTRAVENOUS EVERY 6 HOURS PRN
Status: DISCONTINUED | OUTPATIENT
Start: 2023-05-15 | End: 2023-05-16 | Stop reason: HOSPADM

## 2023-05-15 RX ORDER — IBUPROFEN 800 MG/1
800 TABLET ORAL EVERY 8 HOURS PRN
Status: DISCONTINUED | OUTPATIENT
Start: 2023-05-15 | End: 2023-05-16 | Stop reason: HOSPADM

## 2023-05-15 RX ADMIN — IBUPROFEN 600 MG: 600 TABLET ORAL at 01:12

## 2023-05-15 RX ADMIN — OXYCODONE 10 MG: 5 TABLET ORAL at 01:13

## 2023-05-15 RX ADMIN — OXYCODONE 5 MG: 5 TABLET ORAL at 12:01

## 2023-05-15 RX ADMIN — DOCUSATE SODIUM 100 MG: 100 CAPSULE, LIQUID FILLED ORAL at 09:27

## 2023-05-15 RX ADMIN — Medication 1 TABLET: at 09:27

## 2023-05-15 RX ADMIN — SIMETHICONE 80 MG: 80 TABLET, CHEWABLE ORAL at 09:26

## 2023-05-15 RX ADMIN — DOCUSATE SODIUM 100 MG: 100 CAPSULE, LIQUID FILLED ORAL at 22:41

## 2023-05-15 RX ADMIN — SIMETHICONE 80 MG: 80 TABLET, CHEWABLE ORAL at 05:24

## 2023-05-15 RX ADMIN — IBUPROFEN 800 MG: 800 TABLET ORAL at 16:38

## 2023-05-15 RX ADMIN — OXYCODONE 10 MG: 5 TABLET ORAL at 05:18

## 2023-05-15 RX ADMIN — IBUPROFEN 600 MG: 600 TABLET ORAL at 07:22

## 2023-05-15 RX ADMIN — SIMETHICONE 80 MG: 80 TABLET, CHEWABLE ORAL at 16:38

## 2023-05-15 RX ADMIN — ACETAMINOPHEN 1000 MG: 500 TABLET ORAL at 09:31

## 2023-05-15 RX ADMIN — ACETAMINOPHEN 1000 MG: 500 TABLET ORAL at 17:51

## 2023-05-15 ASSESSMENT — PAIN DESCRIPTION - ORIENTATION
ORIENTATION: LOWER
ORIENTATION: ANTERIOR;LOWER
ORIENTATION: LOWER

## 2023-05-15 ASSESSMENT — PAIN DESCRIPTION - LOCATION
LOCATION: ABDOMEN

## 2023-05-15 ASSESSMENT — PAIN DESCRIPTION - DESCRIPTORS
DESCRIPTORS: ACHING
DESCRIPTORS: CRAMPING;ACHING
DESCRIPTORS: BURNING;ACHING
DESCRIPTORS: CRAMPING;ACHING
DESCRIPTORS: ACHING;CRAMPING
DESCRIPTORS: CRAMPING;ACHING;SORE
DESCRIPTORS: CRAMPING
DESCRIPTORS: ACHING;SORE

## 2023-05-15 ASSESSMENT — PAIN SCALES - GENERAL
PAINLEVEL_OUTOF10: 6
PAINLEVEL_OUTOF10: 3
PAINLEVEL_OUTOF10: 5
PAINLEVEL_OUTOF10: 6
PAINLEVEL_OUTOF10: 6
PAINLEVEL_OUTOF10: 5
PAINLEVEL_OUTOF10: 6
PAINLEVEL_OUTOF10: 5

## 2023-05-15 NOTE — LACTATION NOTE
This note was copied from a baby's chart. Mother states nursing is going well, without discomfort, and she is offering the breast to early cues of hunger. Normal  behaviors and output expectations reviewed. Mother encouraged to call next feed. Mother shown how to hand express and approx 5 gtts given to infant via finger. Discussed with mother her plan for feeding. Reviewed the benefits of exclusive breast milk feeding during the hospital stay. Informed her of the risks of using formula to supplement in the first few days of life as well as the benefits of successful breast milk feeding; referred her to the Breastfeeding booklet about this information. She acknowledges understanding of information reviewed and states that it is her plan to breastfeed her infant. Will support her choice and offer additional information as needed. Hand Expression Education:  Mom taught how to manually hand express her colostrum. Emphasized the importance of providing infant with valuable colostrum as infant rests skin to skin at breast.  Aware to avoid extended periods of non-feeding. Aware to offer 10-20+ drops of colostrum every 2-3 hours until infant is latching and nursing effectively. Taught the rationale behind this low tech but highly effective evidence based practice. Pt will successfully establish breastfeeding by feeding in response to early feeding cues   or wake every 3h, will obtain deep latch, and will keep log of feedings/output. Taught to BF at hunger cues and or q 2-3 hrs and to offer 10-20 drops of hand expressed colostrum at any non-feeds. Left Breast: Soft  Left Nipple: Protrude  Right Nipple: Protrude  Right Breast: Soft  Position and Latch:  Independently  Signs of Transfer: Nutritive sucking  Maternal Response: Attentive, Comfortable, Relaxed and confident  Infant Supplementation: Expressed Breast Milk        Latch: Grasps breast, tongue down, lips flanged, rhythmic sucking

## 2023-05-15 NOTE — PROGRESS NOTES
Post-Operative  Day 2    [facility]     Information for the patient's :  Nile Brown, Female Paul Castillo [[id number]]   , Low Transverse      Patient doing well without unusual complaints. Notes incisional pain and she has been using 2 Oxy everytime. Breast feeding well. Desires POD3 discharge. Voids easily and tolerates regular diet. Vitals:  /65   Pulse 86   Temp 98.3 °F (36.8 °C) (Oral)   Resp 15   Ht 1.448 m (4' 9\")   Wt 73.5 kg (162 lb)   SpO2 97%   Breastfeeding Unknown   BMI 35.06 kg/m²   Temp (24hrs), Av.5 °F (36.9 °C), Min:98.3 °F (36.8 °C), Max:98.6 °F (37 °C)    Exam:      Patient without distress. Abdomen: soft, expected tenderness, fundus firm                Incision clean, dry and intact; Dermabond               Lower extremities are nontender with 1+ edema. Labs:   Lab Results   Component Value Date/Time    WBC 21.3 2023 04:45 AM    WBC 15.6 2023 01:44 AM    WBC 15.1 2023 02:30 AM    HGB 9.2 2023 04:45 AM    HGB 11.5 2023 01:44 AM    HGB 11.1 2023 02:30 AM    HCT 27.0 2023 04:45 AM    HCT 33.2 2023 01:44 AM    HCT 33.0 2023 02:30 AM     2023 04:45 AM     2023 01:44 AM     2023 02:30 AM       No results found for this or any previous visit (from the past 24 hour(s)). Assessment: Post-Op day 2, doing well; O-/RI/XX \"Anyanys\"    Plan:   1. Routine post-operative care  2. Lactation assistance  3. Ambulate outside of room  4.  Mom O--; Baby O+: Rhogam given

## 2023-05-16 VITALS
HEART RATE: 77 BPM | SYSTOLIC BLOOD PRESSURE: 112 MMHG | OXYGEN SATURATION: 95 % | WEIGHT: 162 LBS | RESPIRATION RATE: 16 BRPM | TEMPERATURE: 98.7 F | DIASTOLIC BLOOD PRESSURE: 68 MMHG | BODY MASS INDEX: 34.95 KG/M2 | HEIGHT: 57 IN

## 2023-05-16 PROCEDURE — 6370000000 HC RX 637 (ALT 250 FOR IP): Performed by: OBSTETRICS & GYNECOLOGY

## 2023-05-16 RX ORDER — OXYCODONE HYDROCHLORIDE 5 MG/1
5 TABLET ORAL EVERY 8 HOURS PRN
Qty: 15 TABLET | Refills: 0 | Status: SHIPPED | OUTPATIENT
Start: 2023-05-16 | End: 2023-05-21

## 2023-05-16 RX ORDER — IBUPROFEN 800 MG/1
800 TABLET ORAL EVERY 8 HOURS PRN
Qty: 60 TABLET | Refills: 1 | Status: SHIPPED | OUTPATIENT
Start: 2023-05-16

## 2023-05-16 RX ADMIN — DOCUSATE SODIUM 100 MG: 100 CAPSULE, LIQUID FILLED ORAL at 08:25

## 2023-05-16 RX ADMIN — ACETAMINOPHEN 1000 MG: 500 TABLET ORAL at 08:25

## 2023-05-16 RX ADMIN — OXYCODONE 5 MG: 5 TABLET ORAL at 00:06

## 2023-05-16 RX ADMIN — IBUPROFEN 800 MG: 800 TABLET ORAL at 10:37

## 2023-05-16 RX ADMIN — OXYCODONE 5 MG: 5 TABLET ORAL at 08:25

## 2023-05-16 RX ADMIN — IBUPROFEN 800 MG: 800 TABLET ORAL at 01:53

## 2023-05-16 RX ADMIN — OXYCODONE 10 MG: 5 TABLET ORAL at 12:27

## 2023-05-16 RX ADMIN — Medication 1 TABLET: at 08:25

## 2023-05-16 ASSESSMENT — PAIN DESCRIPTION - ORIENTATION
ORIENTATION: LOWER
ORIENTATION: LOWER
ORIENTATION: ANTERIOR

## 2023-05-16 ASSESSMENT — PAIN SCALES - GENERAL
PAINLEVEL_OUTOF10: 5
PAINLEVEL_OUTOF10: 8
PAINLEVEL_OUTOF10: 6
PAINLEVEL_OUTOF10: 2
PAINLEVEL_OUTOF10: 7

## 2023-05-16 ASSESSMENT — PAIN DESCRIPTION - LOCATION
LOCATION: ABDOMEN
LOCATION: INCISION
LOCATION: ABDOMEN

## 2023-05-16 ASSESSMENT — PAIN - FUNCTIONAL ASSESSMENT
PAIN_FUNCTIONAL_ASSESSMENT: ACTIVITIES ARE NOT PREVENTED
PAIN_FUNCTIONAL_ASSESSMENT: ACTIVITIES ARE NOT PREVENTED

## 2023-05-16 ASSESSMENT — PAIN DESCRIPTION - DESCRIPTORS
DESCRIPTORS: ACHING;BURNING
DESCRIPTORS: ACHING
DESCRIPTORS: ACHING;BURNING
DESCRIPTORS: ACHING
DESCRIPTORS: CRAMPING

## 2023-05-16 NOTE — PROGRESS NOTES
Post-Operative  Day 3     [facility]       Patient doing well without complaints. Ambulating outside of the room now and pain is tolerated. Breast feeding. Vitals:  /68   Pulse 77   Temp 98.7 °F (37.1 °C) (Oral)   Resp 16   Ht 1.448 m (4' 9\")   Wt 73.5 kg (162 lb)   SpO2 95%   Breastfeeding Unknown   BMI 35.06 kg/m²   Temp (24hrs), Av.5 °F (36.9 °C), Min:98.2 °F (36.8 °C), Max:98.7 °F (37.1 °C)    Exam:      Patient without distress. Abdomen: soft, expected tenderness, fundus firm               Incision clean, dry and intact; Dermabond               Lower extremities are nontender with 1+ edema. Labs:   Lab Results   Component Value Date/Time    WBC 21.3 2023 04:45 AM    WBC 15.6 2023 01:44 AM    WBC 15.1 2023 02:30 AM    HGB 9.2 2023 04:45 AM    HGB 11.5 2023 01:44 AM    HGB 11.1 2023 02:30 AM    HCT 27.0 2023 04:45 AM    HCT 33.2 2023 01:44 AM    HCT 33.0 2023 02:30 AM     2023 04:45 AM     2023 01:44 AM     2023 02:30 AM     No results found for this or any previous visit (from the past 24 hour(s)). Assessment: Post-Op day 3, doing well; O-/RI/XX \"Anyanys\"    Plan:   1. Discharge home today  2. Instructions given- pelvic rest, restrictions on driving and lifting, wound care instructions, follow-up  3.  Discharge Medications: see discharge instruction sheet

## (undated) DEVICE — PENCIL ES L3M ROCK SWCH S STL HEX LOK BLDE ELECTRD HOLSTER

## (undated) DEVICE — 3000CC GUARDIAN II: Brand: GUARDIAN

## (undated) DEVICE — APPLICATOR MEDICATED 26 CC SOLUTION HI LT ORNG CHLORAPREP

## (undated) DEVICE — SYRINGE IRRIG 60ML SFT PLIABLE BLB EZ TO GRP 1 HND USE W/

## (undated) DEVICE — C-SECTION II-LF: Brand: MEDLINE INDUSTRIES, INC.

## (undated) DEVICE — ELECTRODE PT RET AD L9FT HI MOIST COND ADH HYDRGEL CORDED

## (undated) DEVICE — DRESSING BORDERED ADH GZ UNIV GEN USE 8INX4IN AND 6INX2IN

## (undated) DEVICE — LARGE, DISPOSABLE ALEXIS O C-SECTION PROTECTOR - RETRACTOR: Brand: ALEXIS ® O C-SECTION PROTECTOR - RETRACTOR

## (undated) DEVICE — STAPLER SKIN SQ 30 ABSRB STPL DISP INSORB ORDER VIA PHONE OR EMAIL

## (undated) DEVICE — SOLUTION IV 1000ML 0.9% SOD CHL

## (undated) DEVICE — TOTAL TRAY, 16FR 10ML SIL FOLEY, URN: Brand: MEDLINE

## (undated) DEVICE — CLEANER ES TIP W2XL2IN ADH BK RADPQ FOR S STL ELECTRD

## (undated) DEVICE — SOLUTION IRRIG 1000ML 0.9% SOD CHL USP POUR PLAS BTL

## (undated) DEVICE — STRAP,POSITIONING,KNEE/BODY,FOAM,4X60": Brand: MEDLINE

## (undated) DEVICE — SOLIDIFIER FLUID 3000 CC ABSORB

## (undated) DEVICE — CANISTER, RIGID, 3000CC: Brand: MEDLINE INDUSTRIES, INC.

## (undated) DEVICE — STAPLER SKIN H3.9MM WIRE DIA0.58MM CRWN 6.9MM 35 STPL ROT

## (undated) DEVICE — TOWEL,OR,DSP,ST,BLUE,STD,2/PK,40PK/CS: Brand: MEDLINE

## (undated) DEVICE — SPONGE LAPAROTOMY W18XL18IN WHITE STRUNG RADIOPAQUE STERILE

## (undated) DEVICE — ADHESIVE SKIN CLSR 0.7ML TOP DERMBND ADV

## (undated) DEVICE — TRAY,URINE METER,100% SILICONE,16FR10ML: Brand: MEDLINE

## (undated) DEVICE — GARMENT,MEDLINE,DVT,INT,CALF,MED, GEN2: Brand: MEDLINE

## (undated) DEVICE — INTENT OT USE PROVIDES A STERILE INTERFACE BETWEEN THE OPERATING ROOM SURGICAL LAMPS (NON-STERILE) AND THE SURGEON OR STAFF WORKING IN THE STERILE FIELD.: Brand: ASPEN® ALC PLUS LIGHT HANDLE COVER

## (undated) DEVICE — ADHESIVE SKIN CLSR 0.7ML SGL PEEL PCH GEL WTRPRF FOR WOUNDS

## (undated) DEVICE — BLADE CLIPPER GEN PURP NS

## (undated) DEVICE — STERILE LATEX POWDER-FREE SURGICAL GLOVESWITH NITRILE COATING: Brand: PROTEXIS

## (undated) DEVICE — APPLICATOR BNDG 1MM ADH PREMIERPRO EXOFIN